# Patient Record
Sex: FEMALE | Race: BLACK OR AFRICAN AMERICAN | Employment: FULL TIME | URBAN - METROPOLITAN AREA
[De-identification: names, ages, dates, MRNs, and addresses within clinical notes are randomized per-mention and may not be internally consistent; named-entity substitution may affect disease eponyms.]

---

## 2018-11-07 LAB
CREATININE, EXTERNAL: 0.71
HBA1C MFR BLD HPLC: 5.8 %
LDL-C, EXTERNAL: 125

## 2020-03-03 ENCOUNTER — OFFICE VISIT (OUTPATIENT)
Dept: INTERNAL MEDICINE CLINIC | Age: 60
End: 2020-03-03

## 2020-03-03 VITALS
SYSTOLIC BLOOD PRESSURE: 112 MMHG | HEIGHT: 66 IN | BODY MASS INDEX: 28.48 KG/M2 | HEART RATE: 87 BPM | RESPIRATION RATE: 16 BRPM | DIASTOLIC BLOOD PRESSURE: 77 MMHG | OXYGEN SATURATION: 96 % | WEIGHT: 177.2 LBS

## 2020-03-03 DIAGNOSIS — M62.838 TRAPEZIUS MUSCLE SPASM: ICD-10-CM

## 2020-03-03 DIAGNOSIS — M75.42 IMPINGEMENT SYNDROME OF LEFT SHOULDER: Primary | ICD-10-CM

## 2020-03-03 DIAGNOSIS — M25.512 ACUTE PAIN OF LEFT SHOULDER: ICD-10-CM

## 2020-03-03 RX ORDER — MELOXICAM 15 MG/1
15 TABLET ORAL DAILY
Qty: 30 TAB | Refills: 0 | Status: SHIPPED | OUTPATIENT
Start: 2020-03-03 | End: 2020-04-06 | Stop reason: SDUPTHER

## 2020-03-03 RX ORDER — IBUPROFEN 200 MG
TABLET ORAL
COMMUNITY

## 2020-03-03 NOTE — PROGRESS NOTES
Chief Complaint   Patient presents with    Shoulder Pain     she is a 64y.o. year old female who presents for evaluation of left shoulder  Pain Assessment Encounter      Dara Phillip  3/3/2020  Onset of Symptoms: years  ________________________________________________________________________  Description: has had problems with bursitis and has had spasms in the neck. Pain now is a dull aching pain and spasming. ROM is limited. Frequency: more than 5 times a day  Pain Scale:(1-10): 5  Trauma Hx: none  Hx of similar symptoms: Yes  Radiation: yes down the arm   Duration:  consistent       Progression: has worsened  What makes it better?: OTC meds and rest  What makes it worse?:certain movements  Medications tried: acetaminophen, ibuprofen    Reviewed and agree with Nurse Note and duplicated in this note. Reviewed PmHx, RxHx, FmHx, SocHx, AllgHx and updated and dated in the chart. No family history on file. No past medical history on file. Social History     Patient does not qualify to have social determinant information on file (likely too young). Socioeconomic History    Marital status:      Spouse name: Not on file    Number of children: Not on file    Years of education: Not on file    Highest education level: Not on file        Review of Systems - negative except as listed above      Objective:     Vitals:    03/03/20 1009   BP: 112/77   Pulse: 87   Resp: 16   SpO2: 96%   Weight: 177 lb 3.2 oz (80.4 kg)   Height: 5' 6\" (1.676 m)       Physical Examination: General appearance - alert, well appearing, and in no distress  Back exam - full range of motion, no tenderness, palpable spasm or pain on motion  Neurological - alert, oriented, normal speech, no focal findings or movement disorder noted  Musculoskeletal - The left shoulder is  normal to inspection. The patient has diminished range with pain  . The shoulderis tender to palpation left upper trap.   Bicep tendon: non-tender  The NEER test is positive. The Dobbs test:is positive   The Cross over test:is  negative. The Empty Can test:is  positive. Stressed ext rotation is:  negative. Stressed int rotation: negative. The Apprehension Sign is negative. The Lift off test is:  negative   Examination reveals the Painful Arch:  positive. The Labral Test is:  not tested. The Sulcus Sign is:  not tested. Extremities - peripheral pulses normal, no pedal edema, no clubbing or cyanosis  Skin - normal coloration and turgor, no rashes, no suspicious skin lesions noted    Assessment/ Plan:   Diagnoses and all orders for this visit:    1. Impingement syndrome of left shoulder  -     REFERRAL TO PHYSICAL THERAPY    2. Acute pain of left shoulder  -     XR SHOULDER LT AP/LAT MIN 2 V; Future    3. Trapezius muscle spasm  -     REFERRAL TO PHYSICAL THERAPY         Pathophysiology, recovery and rehabilitation process discussed and questions answered   Counseling for 30 Minutes of the total visit duration   Pictures and figures used as necessary   Provided reassurance   Recommend activity modification   Recommend  lower impact activities-walking, Eliptical, Nordic Track, cycling or swimming   Follow up in 4 week(s)         1) Remember to stay active and/or exercise regularly (I suggest 30-45 minutes daily)   2) For reliable dietary information, go to www. EATRIGHT.org. You may wish to consider seeing the nutritionist at [facility] [phone], also consider the [address] I have discussed the diagnosis with the patient and the intended plan as seen in the above orders. The patient has received an after-visit summary and questions were answered concerning future plans.      Medication Side Effects and Warnings were discussed with patient,  Patient Labs were reviewed and or requested, and  Patient Past Records were reviewed and or requested  yes      Pt agrees to call or return to clinic and/or go to closest ER with any worsening of symptoms. This may include, but not limited to increased fever (>100.4) with NSAIDS or Tylenol, increased edema, confusion, rash, worsening of presenting symptoms.

## 2020-04-06 RX ORDER — MELOXICAM 15 MG/1
15 TABLET ORAL DAILY
Qty: 30 TAB | Refills: 0 | Status: SHIPPED | OUTPATIENT
Start: 2020-04-06 | End: 2022-06-02 | Stop reason: ALTCHOICE

## 2020-11-10 VITALS
HEART RATE: 73 BPM | OXYGEN SATURATION: 97 % | WEIGHT: 166 LBS | HEIGHT: 66 IN | TEMPERATURE: 97.7 F | BODY MASS INDEX: 26.68 KG/M2 | SYSTOLIC BLOOD PRESSURE: 118 MMHG | DIASTOLIC BLOOD PRESSURE: 72 MMHG

## 2020-11-10 PROBLEM — K21.9 GERD (GASTROESOPHAGEAL REFLUX DISEASE): Status: ACTIVE | Noted: 2020-11-10

## 2020-11-10 PROBLEM — K57.90 DIVERTICULAR DISEASE: Status: ACTIVE | Noted: 2020-11-10

## 2020-11-10 PROBLEM — R73.01 IMPAIRED FASTING BLOOD SUGAR: Status: ACTIVE | Noted: 2020-11-10

## 2020-11-10 PROBLEM — E66.3 OVERWEIGHT: Status: ACTIVE | Noted: 2020-11-10

## 2020-11-10 PROBLEM — K58.9 IRRITABLE BOWEL SYNDROME: Status: ACTIVE | Noted: 2020-11-10

## 2020-11-10 PROBLEM — M17.12 PRIMARY LOCALIZED OSTEOARTHRITIS OF LEFT KNEE: Status: ACTIVE | Noted: 2018-06-04

## 2020-11-16 RX ORDER — OMEGA-3-ACID ETHYL ESTERS 1 G/1
1 CAPSULE, LIQUID FILLED ORAL DAILY
COMMUNITY
End: 2020-12-14

## 2020-11-16 RX ORDER — SAME BUTANEDISULFONATE/BETAINE 400-600 MG
250 POWDER IN PACKET (EA) ORAL DAILY
COMMUNITY
End: 2020-11-19 | Stop reason: ALTCHOICE

## 2020-11-16 RX ORDER — UBIDECARENONE 30 MG
30 CAPSULE ORAL DAILY
COMMUNITY
End: 2022-07-14

## 2020-11-16 RX ORDER — MELOXICAM 15 MG/1
15 TABLET ORAL DAILY
COMMUNITY
Start: 2018-12-26 | End: 2020-11-19 | Stop reason: SDUPTHER

## 2020-11-19 ENCOUNTER — OFFICE VISIT (OUTPATIENT)
Dept: FAMILY MEDICINE CLINIC | Age: 60
End: 2020-11-19
Payer: COMMERCIAL

## 2020-11-19 VITALS
DIASTOLIC BLOOD PRESSURE: 72 MMHG | TEMPERATURE: 96.8 F | WEIGHT: 175 LBS | OXYGEN SATURATION: 98 % | BODY MASS INDEX: 28.12 KG/M2 | HEIGHT: 66 IN | RESPIRATION RATE: 12 BRPM | SYSTOLIC BLOOD PRESSURE: 120 MMHG | HEART RATE: 69 BPM

## 2020-11-19 DIAGNOSIS — R73.01 IMPAIRED FASTING BLOOD SUGAR: ICD-10-CM

## 2020-11-19 DIAGNOSIS — M19.90 ARTHRITIS: ICD-10-CM

## 2020-11-19 DIAGNOSIS — Z11.59 ENCOUNTER FOR HEPATITIS C SCREENING TEST FOR LOW RISK PATIENT: Primary | ICD-10-CM

## 2020-11-19 DIAGNOSIS — Z00.00 ENCOUNTER FOR WELLNESS EXAMINATION IN ADULT: ICD-10-CM

## 2020-11-19 DIAGNOSIS — Z23 IMMUNIZATION DUE: ICD-10-CM

## 2020-11-19 PROCEDURE — 99396 PREV VISIT EST AGE 40-64: CPT | Performed by: NURSE PRACTITIONER

## 2020-11-19 PROCEDURE — 90715 TDAP VACCINE 7 YRS/> IM: CPT | Performed by: NURSE PRACTITIONER

## 2020-11-19 PROCEDURE — 90471 IMMUNIZATION ADMIN: CPT | Performed by: NURSE PRACTITIONER

## 2020-11-19 NOTE — PROGRESS NOTES
Subjective  Chief Complaint   Patient presents with    Physical     fasting     HPI:  Godwin Moreno is a 64 y.o. female. Bea Ganser is a 63 yo female who presents for her annual Wellness and physical with fasting labs. Her last colonoscopy was in 2014 and was to be repeated in 5 years and patient states that she has gotten a reminder from the GI to schedule this appointment and will do so. She is scheduled for a pap this afternoon at 1:30 pm.  I am sending an order to her pharmacy for administration of the shingrix vaccine and she got her flu vaccine last Monday at her local pharmacy. She has an eye appointment scheduled for next week. She has a diagnosis of IBS and is followed by GI for this (Dr. Thaddeus Gomez).   She is otherwise without complaints    Past Medical History:   Diagnosis Date    Arthritis     Diverticular disease 11/10/2020    GERD (gastroesophageal reflux disease) 11/10/2020    Impaired fasting blood sugar 11/10/2020    Irritable bowel syndrome 11/10/2020    Overweight 11/10/2020     Family History   Problem Relation Age of Onset    Diabetes Mother     Hypertension Mother     Diabetes Father     Hypertension Sister     Cancer Maternal Grandmother     Diabetes Paternal Grandfather      Social History     Socioeconomic History    Marital status:      Spouse name: Not on file    Number of children: Not on file    Years of education: Not on file    Highest education level: Not on file   Occupational History    Not on file   Social Needs    Financial resource strain: Not on file    Food insecurity     Worry: Not on file     Inability: Not on file    Transportation needs     Medical: Not on file     Non-medical: Not on file   Tobacco Use    Smoking status: Never Smoker    Smokeless tobacco: Never Used   Substance and Sexual Activity    Alcohol use: Never     Frequency: Never    Drug use: Never    Sexual activity: Yes   Lifestyle    Physical activity     Days per week: Not on file     Minutes per session: Not on file    Stress: Not on file   Relationships    Social connections     Talks on phone: Not on file     Gets together: Not on file     Attends [employer] service: Not on file     Active member of club or organization: Not on file     Attends meetings of clubs or organizations: Not on file     Relationship status: Not on file    Intimate partner violence     Fear of current or ex partner: Not on file     Emotionally abused: Not on file     Physically abused: Not on file     Forced sexual activity: Not on file   Other Topics Concern    Not on file   Social History Narrative    Not on file     Current Outpatient Medications on File Prior to Visit   Medication Sig Dispense Refill    omega-3 acid ethyl esters (LOVAZA) 1 gram capsule Take 1 Cap by mouth daily.  coenzyme Q-10 (CO Q-10) 30 mg capsule Take 30 mg by mouth daily.  ibuprofen (MOTRIN) 200 mg tablet Take  by mouth.  meloxicam (MOBIC) 15 mg tablet Take 1 Tab by mouth daily. 30 Tab 0     No current facility-administered medications on file prior to visit. Allergies   Allergen Reactions    Latex Rash     Latex gloves     ROS   ROS per PMH and HPI    Objective  Physical Exam  Vitals signs reviewed. HENT:      Head: Normocephalic. Neck:      Musculoskeletal: Normal range of motion. Cardiovascular:      Rate and Rhythm: Normal rate. Pulmonary:      Effort: Pulmonary effort is normal.      Breath sounds: Normal breath sounds. Abdominal:      Palpations: Abdomen is soft. Musculoskeletal: Normal range of motion. Skin:     General: Skin is warm and dry. Neurological:      Mental Status: She is alert and oriented to person, place, and time. Psychiatric:         Mood and Affect: Mood normal.         Behavior: Behavior normal.         Thought Content: Thought content normal.         Judgment: Judgment normal.          Assessment & Plan      ICD-10-CM ICD-9-CM    1.  Encounter for hepatitis C screening test for low risk patient  Z11.59 V73.89 HEPATITIS C AB   2. Impaired fasting blood sugar  R73.01 790.21 HEMOGLOBIN A1C WITH EAG      LIPID PANEL      CBC WITH AUTOMATED DIFF      MICROALBUMIN, UR, RAND W/ MICROALB/CREAT RATIO   3. Immunization due  Z23 V05.9 TETANUS, DIPHTHERIA TOXOIDS AND ACELLULAR PERTUSSIS VACCINE (TDAP), IN INDIVIDS. >=7, IM   4. Encounter for wellness examination in adult  Z00.00 V70.0    5. Arthritis  M19.90 716.90      Diagnoses and all orders for this visit:    1. Encounter for hepatitis C screening test for low risk patient  -     HEPATITIS C AB    2. Impaired fasting blood sugar  -     HEMOGLOBIN A1C WITH EAG  -     LIPID PANEL  -     CBC WITH AUTOMATED DIFF  -     MICROALBUMIN, UR, RAND W/ MICROALB/CREAT RATIO    3. Immunization due  -     TETANUS, DIPHTHERIA TOXOIDS AND ACELLULAR PERTUSSIS VACCINE (TDAP), IN INDIVIDS. >=7, IM    4. Encounter for wellness examination in adult    5. Arthritis      Follow-up and Dispositions    · Return in about 1 year (around 11/19/2021) for Wellness with fasting labs.        Leif Torrez, ARCENIO

## 2020-11-20 LAB
ALBUMIN/CREAT UR: 4 MG/G CREAT (ref 0–29)
BASOPHILS # BLD AUTO: 0.1 X10E3/UL (ref 0–0.2)
BASOPHILS NFR BLD AUTO: 1 %
CHOLEST SERPL-MCNC: 244 MG/DL (ref 100–199)
CREAT UR-MCNC: 126.4 MG/DL
EOSINOPHIL # BLD AUTO: 0.2 X10E3/UL (ref 0–0.4)
EOSINOPHIL NFR BLD AUTO: 5 %
ERYTHROCYTE [DISTWIDTH] IN BLOOD BY AUTOMATED COUNT: 13.4 % (ref 11.7–15.4)
EST. AVERAGE GLUCOSE BLD GHB EST-MCNC: 123 MG/DL
HBA1C MFR BLD: 5.9 % (ref 4.8–5.6)
HCT VFR BLD AUTO: 40.4 % (ref 34–46.6)
HCV AB S/CO SERPL IA: <0.1 S/CO RATIO (ref 0–0.9)
HDLC SERPL-MCNC: 68 MG/DL
HGB BLD-MCNC: 13.1 G/DL (ref 11.1–15.9)
IMM GRANULOCYTES # BLD AUTO: 0 X10E3/UL (ref 0–0.1)
IMM GRANULOCYTES NFR BLD AUTO: 0 %
LDLC SERPL CALC-MCNC: 154 MG/DL (ref 0–99)
LYMPHOCYTES # BLD AUTO: 2 X10E3/UL (ref 0.7–3.1)
LYMPHOCYTES NFR BLD AUTO: 45 %
MCH RBC QN AUTO: 27.6 PG (ref 26.6–33)
MCHC RBC AUTO-ENTMCNC: 32.4 G/DL (ref 31.5–35.7)
MCV RBC AUTO: 85 FL (ref 79–97)
MICROALBUMIN UR-MCNC: 5.2 UG/ML
MONOCYTES # BLD AUTO: 0.4 X10E3/UL (ref 0.1–0.9)
MONOCYTES NFR BLD AUTO: 8 %
NEUTROPHILS # BLD AUTO: 1.8 X10E3/UL (ref 1.4–7)
NEUTROPHILS NFR BLD AUTO: 41 %
PLATELET # BLD AUTO: 354 X10E3/UL (ref 150–450)
RBC # BLD AUTO: 4.75 X10E6/UL (ref 3.77–5.28)
TRIGL SERPL-MCNC: 124 MG/DL (ref 0–149)
VLDLC SERPL CALC-MCNC: 22 MG/DL (ref 5–40)
WBC # BLD AUTO: 4.4 X10E3/UL (ref 3.4–10.8)

## 2020-11-29 PROBLEM — M19.90 ARTHRITIS: Status: ACTIVE | Noted: 2020-11-29

## 2020-11-29 PROBLEM — N39.3 STRESS INCONTINENCE: Status: ACTIVE | Noted: 2020-11-29

## 2020-12-11 NOTE — PROGRESS NOTES
Your A1C is 5.9 and it was 5.8 at your last set of labs so I would say that it is stable at this time. Your total cholesterol and bad cholesterol are slightly elevated. Your total cholesterol was 204 and it is now 244 and your bad cholesterol was 125 and it is now 154. I see that your are on Lovaza which is treatment for high triglycerides and your triglycerides are WNL at this time. I would suggest lifestyle changes for the other elements of your lipid profiles such as:  Decrease your intake of red meat and increase your intake of fatty fish such as salmon, mackerel and sardines. Increase fiber in your diet. Decrease your intake of full fat dairy products such as milk and yogurt and try to incorporate at least 30 minutes of exercise most days of the week and losing weight helps everything.   Your other labs are WNL and we will recheck all of your labs at your next office visit

## 2020-12-19 PROBLEM — Z00.00 ENCOUNTER FOR WELLNESS EXAMINATION IN ADULT: Status: RESOLVED | Noted: 2020-11-19 | Resolved: 2020-12-19

## 2022-01-05 ENCOUNTER — TELEPHONE (OUTPATIENT)
Dept: FAMILY MEDICINE CLINIC | Age: 62
End: 2022-01-05

## 2022-01-05 ENCOUNTER — NURSE TRIAGE (OUTPATIENT)
Dept: OTHER | Facility: CLINIC | Age: 62
End: 2022-01-05

## 2022-01-05 NOTE — TELEPHONE ENCOUNTER
Received call from [facility] at [facility] with Red Flag Complaint. Subjective: Caller states \"has a sore soft tender spot in right  hairline about the size of a quarter. Negative covid test yesterday. right gland behind ear is swollen about size of a pea and has some pressure in right ear\"     Current Symptoms: see above    Onset: 4 days ago; sudden    Associated Symptoms: NA    Pain Severity: 4/10; sharp; constant    Temperature: denies fever     What has been tried: NSAID, antifungal cream    LMP: NA Pregnant: NA    Recommended disposition: see in office in next 3 days    Care advice provided, patient verbalizes understanding; denies any other questions or concerns; instructed to call back for any new or worsening symptoms. Writer provided warm transfer to Jo Russell at [facility] for appointment scheduling    Attention Provider: Thank you for allowing me to participate in the care of your patient. The patient was connected to triage in response to information provided to the E[facility]. Please do not respond through this encounter as the response is not directed to a shared pool.       Reason for Disposition   Patient wants to be seen    Protocols used: SKIN LUMP OR LOCALIZED SWELLING-ADULT-OH

## 2022-01-05 NOTE — TELEPHONE ENCOUNTER
----- Message from Linden Chapin sent at 1/5/2022 11:06 AM EST -----  Subject: Appointment Request    Reason for Call: Semi-Urgent Return from RN Triage    QUESTIONS  Type of Appointment? Established Patient  Reason for appointment request? No appointments available during search  Additional Information for Provider? Pt is calling she is a return from [facility]   was recommended to be seen in 3 days no appts are available during my   search. She was speaking with someone but was disconnected and no one   called her back. Please contact pt asap tried to transfer to office only   received a busy signal.   ---------------------------------------------------------------------------  --------------  [facility]  What is the best way for the office to contact you? OK to leave message on   treadalong, OK to respond with electronic message via [employer] portal (only   for patients who have registered [employer] account)  Preferred Call Back Phone Number? [phone]  ---------------------------------------------------------------------------  --------------  SCRIPT ANSWERS  Patient needs to be seen within 3 days? Yes   Nurse Name? Joana Patel  Have you been diagnosed with, awaiting test results for, or told that you   are suspected of having COVID-19 (Coronavirus)? (If patient has tested   negative or was tested as a requirement for work, school, or travel and   not based on symptoms, answer no)? No  Within the past two weeks have you developed any of the following symptoms   (answer no if symptoms have been present longer than 2 weeks or began   more than 2 weeks ago)? Fever or Chills, Cough, Shortness of breath or   difficulty breathing, Loss of taste or smell, Sore throat, Nasal   congestion, Sneezing or runny nose, Fatigue or generalized body aches   (answer no if pain is specific to a body part e.g. back pain), Diarrhea,   Headache? No  Have you had close contact with someone with COVID-19 in the last 14 days?    No  (Service Expert  click yes below to proceed with [employer] As Usual   Scheduling)?  Yes

## 2022-03-18 PROBLEM — R73.01 IMPAIRED FASTING BLOOD SUGAR: Status: ACTIVE | Noted: 2020-11-10

## 2022-03-18 PROBLEM — M19.90 ARTHRITIS: Status: ACTIVE | Noted: 2020-11-29

## 2022-03-18 PROBLEM — K21.9 GERD (GASTROESOPHAGEAL REFLUX DISEASE): Status: ACTIVE | Noted: 2020-11-10

## 2022-03-19 PROBLEM — M17.12 PRIMARY LOCALIZED OSTEOARTHRITIS OF LEFT KNEE: Status: ACTIVE | Noted: 2018-06-04

## 2022-03-19 PROBLEM — K57.90 DIVERTICULAR DISEASE: Status: ACTIVE | Noted: 2020-11-10

## 2022-03-19 PROBLEM — N39.3 STRESS INCONTINENCE: Status: ACTIVE | Noted: 2020-11-29

## 2022-03-19 PROBLEM — K58.9 IRRITABLE BOWEL SYNDROME: Status: ACTIVE | Noted: 2020-11-10

## 2022-03-20 PROBLEM — E66.3 OVERWEIGHT: Status: ACTIVE | Noted: 2020-11-10

## 2022-06-02 ENCOUNTER — TELEPHONE (OUTPATIENT)
Dept: NEUROLOGY | Age: 62
End: 2022-06-02

## 2022-06-02 ENCOUNTER — OFFICE VISIT (OUTPATIENT)
Dept: INTERNAL MEDICINE CLINIC | Age: 62
End: 2022-06-02
Payer: COMMERCIAL

## 2022-06-02 VITALS
RESPIRATION RATE: 16 BRPM | HEIGHT: 66 IN | WEIGHT: 174 LBS | HEART RATE: 69 BPM | BODY MASS INDEX: 27.97 KG/M2 | DIASTOLIC BLOOD PRESSURE: 83 MMHG | SYSTOLIC BLOOD PRESSURE: 124 MMHG | OXYGEN SATURATION: 95 % | TEMPERATURE: 97.8 F

## 2022-06-02 DIAGNOSIS — R73.01 IMPAIRED FASTING BLOOD SUGAR: ICD-10-CM

## 2022-06-02 DIAGNOSIS — R41.3 MEMORY LOSS: ICD-10-CM

## 2022-06-02 DIAGNOSIS — Z00.00 WELL WOMAN EXAM (NO GYNECOLOGICAL EXAM): Primary | ICD-10-CM

## 2022-06-02 PROCEDURE — 99396 PREV VISIT EST AGE 40-64: CPT | Performed by: FAMILY MEDICINE

## 2022-06-02 PROCEDURE — 99214 OFFICE O/P EST MOD 30 MIN: CPT | Performed by: FAMILY MEDICINE

## 2022-06-02 RX ORDER — ACETAMINOPHEN 500 MG
TABLET ORAL
COMMUNITY

## 2022-06-02 RX ORDER — OMEPRAZOLE 10 MG/1
CAPSULE, DELAYED RELEASE ORAL
COMMUNITY

## 2022-06-02 NOTE — PROGRESS NOTES
Chief Complaint   Patient presents with    Complete Physical     Patient is here for a wellness visit. she is a 63y.o. year old female who presents for CPE  Complete Physical Exam Questions:    LMP -  hysterectomy   Last Pap (q 3 years, or q5 with HPV) - 2/2022  Last Mammogram (50-74 biennially)- 2021  Hx of abnl Pap - Yes    1. Do you follow a low fat diet?  no  2. Are you up to date on your Tdap (<10 years)? Yes  3. Have you ever had a Pneumovax vaccine (>65)? No   PCV13 No   PPSV23 No  4. Have you had Zoster vaccine (>60)? No  5. Have you had the HPV - Gardasil (13- 26)? Not applicable  6. Do you follow an exercise program?  yes  7. Do you smoke?  no  8. Do you consider yourself overweight?  no  9. Is there a family history of CAD< age 48? No  10. Is there a family history of Cancer?  yes  11. Do you know your Cancer risks? Yes  12. Have you had a colonoscopy?  no  13. Have you been tested for HIV or other STI's? No HIV testing today(18-66 y/o)? No  14. Have had a bone density scan or DEXA done(>65)? Yes  15. Have you had an EKG performed in the last five years (>50)? No    Other complaints: memory loss    Reviewed and agree with Nurse Note and duplicated in this note. Reviewed PmHx, RxHx, FmHx, SocHx, AllgHx and updated and dated in the chart.     Family History   Problem Relation Age of Onset    Diabetes Mother     Hypertension Mother     Diabetes Father     Hypertension Sister     Cancer Maternal Grandmother     Diabetes Paternal Grandfather        Past Medical History:   Diagnosis Date    Arthritis     Diverticular disease 11/10/2020    GERD (gastroesophageal reflux disease) 11/10/2020    Impaired fasting blood sugar 11/10/2020    Irritable bowel syndrome 11/10/2020    Overweight 11/10/2020      Social History     Socioeconomic History    Marital status:    Tobacco Use    Smoking status: Never Smoker    Smokeless tobacco: Never Used   Substance and Sexual Activity  Alcohol use: Never    Drug use: Never    Sexual activity: Yes     Social Determinants of Health     Physical Activity: Insufficiently Active    Days of Exercise per Week: 1 day    Minutes of Exercise per Session: 30 min        Review of Systems - negative except as listed above      Objective:     Vitals:    06/02/22 1130   BP: 124/83   Pulse: 69   Resp: 16   Temp: 97.8 °F (36.6 °C)   SpO2: 95%   Weight: 174 lb (78.9 kg)   Height: 5' 6\" (1.676 m)       Physical Examination: General appearance - alert, well appearing, and in no distress  Eyes - pupils equal and reactive, extraocular eye movements intact  Ears - bilateral TM's and external ear canals normal  Nose - normal and patent, no erythema, discharge or polyps  Mouth - mucous membranes moist, pharynx normal without lesions  Neck - supple, no significant adenopathy  Chest - clear to auscultation, no wheezes, rales or rhonchi, symmetric air entry  Heart - normal rate, regular rhythm, normal S1, S2, no murmurs, rubs, clicks or gallops  Abdomen - soft, nontender, nondistended, no masses or organomegaly  Neurological - alert, oriented, normal speech, no focal findings or movement disorder noted  Musculoskeletal - no joint tenderness, deformity or swelling  Extremities - peripheral pulses normal, no pedal edema, no clubbing or cyanosis  Skin - normal coloration and turgor, no rashes, no suspicious skin lesions noted      Assessment/ Plan:   Diagnoses and all orders for this visit:    1. Well woman exam (no gynecological exam)  -     CBC W/O DIFF; Future  -     LIPID PANEL; Future  -     METABOLIC PANEL, COMPREHENSIVE; Future    2. Impaired fasting blood sugar  -     HEMOGLOBIN A1C WITH EAG; Future    3. Memory loss  -     TSH 3RD GENERATION; Future  -     VITAMIN B12 & FOLATE;  Future  -     REFERRAL TO NEUROLOGY           Labs to be drawn: CBC, CMP, Lipid            I have discussed the diagnosis with the patient and the intended plan as seen in the above orders. The patient has received an after-visit summary and questions were answered concerning future plans. Medication Side Effects and Warnings were discussed with patient,  Patient Labs were reviewed and or requested, and  Patient Past Records were reviewed and or requested  yes       Chief Complaint   Patient presents with    Complete Physical     Patient is here for a wellness visit. she is a 63y.o. year old female who presents for evaluation of memory loss over the last 1 month. Patient states that she will constantly be admitted forget what her train of thought was. Denies any head trauma or increased stress or anxiety. Patient states that she never had the symptoms before. She does have a history of Alzheimer's in the family and her father, grandmother and great-grandmother    Reviewed and agree with Nurse Note and duplicated in this note. Reviewed PmHx, RxHx, FmHx, SocHx, AllgHx and updated and dated in the chart.     Family History   Problem Relation Age of Onset    Diabetes Mother     Hypertension Mother     Diabetes Father     Hypertension Sister     Cancer Maternal Grandmother     Diabetes Paternal Grandfather        Past Medical History:   Diagnosis Date    Arthritis     Diverticular disease 11/10/2020    GERD (gastroesophageal reflux disease) 11/10/2020    Impaired fasting blood sugar 11/10/2020    Irritable bowel syndrome 11/10/2020    Overweight 11/10/2020      Social History     Socioeconomic History    Marital status:    Tobacco Use    Smoking status: Never Smoker    Smokeless tobacco: Never Used   Substance and Sexual Activity    Alcohol use: Never    Drug use: Never    Sexual activity: Yes     Social Determinants of Health     Physical Activity: Insufficiently Active    Days of Exercise per Week: 1 day    Minutes of Exercise per Session: 30 min        Review of Systems - negative except as listed above      Objective:     Vitals:    06/02/22 1130   BP: 124/83   Pulse: 69   Resp: 16   Temp: 97.8 °F (36.6 °C)   SpO2: 95%   Weight: 174 lb (78.9 kg)   Height: 5' 6\" (1.676 m)       Physical Examination: General appearance - alert, well appearing, and in no distress  Eyes - pupils equal and reactive, extraocular eye movements intact  Ears - bilateral TM's and external ear canals normal  Nose - normal and patent, no erythema, discharge or polyps  Mouth - mucous membranes moist, pharynx normal without lesions  Neck - supple, no significant adenopathy  Chest - clear to auscultation, no wheezes, rales or rhonchi, symmetric air entry  Heart - normal rate, regular rhythm, normal S1, S2, no murmurs, rubs, clicks or gallops  Abdomen - soft, nontender, nondistended, no masses or organomegaly  Musculoskeletal - no joint tenderness, deformity or swelling  Extremities - peripheral pulses normal, no pedal edema, no clubbing or cyanosis  Skin - normal coloration and turgor, no rashes, no suspicious skin lesions noted     Assessment/ Plan:   Diagnoses and all orders for this visit:    1. Well woman exam (no gynecological exam)  -     CBC W/O DIFF; Future  -     LIPID PANEL; Future  -     METABOLIC PANEL, COMPREHENSIVE; Future    2. Impaired fasting blood sugar  -     HEMOGLOBIN A1C WITH EAG; Future    3. Memory loss  -     TSH 3RD GENERATION; Future  -     VITAMIN B12 & FOLATE; Future  -     REFERRAL TO NEUROLOGY                I have discussed the diagnosis with the patient and the intended plan as seen in the above orders. The patient has received an after-visit summary and questions were answered concerning future plans. Medication Side Effects and Warnings were discussed with patient,  Patient Labs were reviewed and or requested, and  Patient Past Records were reviewed and or requested  yes       Pt agrees to call or return to clinic and/or go to closest ER with any worsening of symptoms.   This may include, but not limited to increased fever (>100.4) with NSAIDS or Tylenol, increased edema, confusion, rash, worsening of presenting symptoms. Please note that this dictation was completed with [employer], the computer voice recognition software. Quite often unanticipated grammatical, syntax, homophones, and other interpretive errors are inadvertently transcribed by the computer software. Please disregard these errors. Please excuse any errors that have escaped final proofreading. Thank you.

## 2022-06-03 LAB
ALBUMIN SERPL-MCNC: 3.6 G/DL (ref 3.5–5)
ALBUMIN/GLOB SERPL: 1 {RATIO} (ref 1.1–2.2)
ALP SERPL-CCNC: 81 U/L (ref 45–117)
ALT SERPL-CCNC: 21 U/L (ref 12–78)
ANION GAP SERPL CALC-SCNC: 3 MMOL/L (ref 5–15)
AST SERPL-CCNC: 16 U/L (ref 15–37)
BILIRUB SERPL-MCNC: 0.5 MG/DL (ref 0.2–1)
BUN SERPL-MCNC: 12 MG/DL (ref 6–20)
BUN/CREAT SERPL: 16 (ref 12–20)
CALCIUM SERPL-MCNC: 9.4 MG/DL (ref 8.5–10.1)
CHLORIDE SERPL-SCNC: 105 MMOL/L (ref 97–108)
CHOLEST SERPL-MCNC: 206 MG/DL
CO2 SERPL-SCNC: 31 MMOL/L (ref 21–32)
CREAT SERPL-MCNC: 0.73 MG/DL (ref 0.55–1.02)
ERYTHROCYTE [DISTWIDTH] IN BLOOD BY AUTOMATED COUNT: 15.5 % (ref 11.5–14.5)
EST. AVERAGE GLUCOSE BLD GHB EST-MCNC: 123 MG/DL
FOLATE SERPL-MCNC: 16.9 NG/ML (ref 5–21)
GLOBULIN SER CALC-MCNC: 3.7 G/DL (ref 2–4)
GLUCOSE SERPL-MCNC: 82 MG/DL (ref 65–100)
HBA1C MFR BLD: 5.9 % (ref 4–5.6)
HCT VFR BLD AUTO: 41 % (ref 35–47)
HDLC SERPL-MCNC: 61 MG/DL
HDLC SERPL: 3.4 {RATIO} (ref 0–5)
HGB BLD-MCNC: 12.4 G/DL (ref 11.5–16)
LDLC SERPL CALC-MCNC: 123.2 MG/DL (ref 0–100)
MCH RBC QN AUTO: 27.8 PG (ref 26–34)
MCHC RBC AUTO-ENTMCNC: 30.2 G/DL (ref 30–36.5)
MCV RBC AUTO: 91.9 FL (ref 80–99)
NRBC # BLD: 0 K/UL (ref 0–0.01)
NRBC BLD-RTO: 0 PER 100 WBC
PLATELET # BLD AUTO: 278 K/UL (ref 150–400)
PMV BLD AUTO: 9.7 FL (ref 8.9–12.9)
POTASSIUM SERPL-SCNC: 4.2 MMOL/L (ref 3.5–5.1)
PROT SERPL-MCNC: 7.3 G/DL (ref 6.4–8.2)
RBC # BLD AUTO: 4.46 M/UL (ref 3.8–5.2)
SODIUM SERPL-SCNC: 139 MMOL/L (ref 136–145)
TRIGL SERPL-MCNC: 109 MG/DL (ref ?–150)
TSH SERPL DL<=0.05 MIU/L-ACNC: 0.91 UIU/ML (ref 0.36–3.74)
VIT B12 SERPL-MCNC: 406 PG/ML (ref 193–986)
VLDLC SERPL CALC-MCNC: 21.8 MG/DL
WBC # BLD AUTO: 3.8 K/UL (ref 3.6–11)

## 2022-06-03 NOTE — PROGRESS NOTES
Blood sugars are in prediabetes territory but holding steady from 1 year ago.   We will continue check in 6 monthsCholesterols come down since last year, continue good work with diet and exercise changes and will repeat in 1 year

## 2022-07-14 ENCOUNTER — OFFICE VISIT (OUTPATIENT)
Dept: NEUROLOGY | Age: 62
End: 2022-07-14
Payer: COMMERCIAL

## 2022-07-14 VITALS
DIASTOLIC BLOOD PRESSURE: 80 MMHG | OXYGEN SATURATION: 98 % | BODY MASS INDEX: 27 KG/M2 | SYSTOLIC BLOOD PRESSURE: 138 MMHG | HEART RATE: 83 BPM | WEIGHT: 168 LBS | HEIGHT: 66 IN

## 2022-07-14 DIAGNOSIS — Z87.898 HISTORY OF SNORING: ICD-10-CM

## 2022-07-14 DIAGNOSIS — R41.89 SUBJECTIVE MEMORY COMPLAINTS: Primary | ICD-10-CM

## 2022-07-14 PROCEDURE — 99204 OFFICE O/P NEW MOD 45 MIN: CPT | Performed by: PSYCHIATRY & NEUROLOGY

## 2022-07-14 NOTE — PROGRESS NOTES
NEUROLOGY  NEW PATIENT EVALUATION/CONSULTATION       PATIENT NAME: Kaaren Hodgkin    MRN: [id number]    REASON FOR CONSULTATION: Memory impairment    07/14/22      Previous records (physician notes, laboratory reports, and radiology reports) and imaging studies were reviewed and summarized. My recommendations will be communicated back to the patient's physician(s) via electronic medical record and/or by [address] [facility] [facility] mail. HISTORY OF PRESENT ILLNESS:  Kaaren Hodgkin is a 63 y.o.  female presenting for evaluation of memory impairment. Onset and progression: 3 months, progressive    Neuropsychiatric symptoms    By Family members account:    Problems with judgment:No   Reduced interest in hobbies/activities: No   Repeats questions, stories, or statements: No    Trouble recalling people's names: No   Trouble learning how to use a tool or appliance: No   Forgetting the correct month or year: No   Difficulty handling financial affairs (bill-paying, taxes): No   Difficulty remembering appointments:Yes    Memory: Short term recall deficits, forgets what she needs to say during conversations  Language: Denies word finding difficulty  Change in personality: None  Socially inappropriate behavior: None  Change in eating habits: None  Physical changes: Not sleeping well in the evenings, +snoring, otherwise denies tremors, focal weakness/numbness, falls, gait instability  Depressive symptoms: None  Hallucinations/Delusions: None    Ability to function:  Driving: Yes, no difficulty  Finances: Handles independently without issues  Manages own medication: Yes, no issues  Residing:  At home with family    Prior work-up:B12, TSH normal    Prior treatments: None      PAST MEDICAL HISTORY:  Past Medical History:   Diagnosis Date    Arthritis     Diverticular disease 11/10/2020    GERD (gastroesophageal reflux disease) 11/10/2020    Impaired fasting blood sugar 11/10/2020    Irritable bowel syndrome 11/10/2020    Osteoarthritis (arthritis due to wear and tear of joints)     knees    Overweight 11/10/2020       PAST SURGICAL HISTORY:  Past Surgical History:   Procedure Laterality Date    HX GI      HX GYN      HX HERNIA REPAIR         FAMILY HISTORY:   Family History   Problem Relation Age of Onset    Diabetes Mother     Hypertension Mother     Diabetes Father     Hypertension Sister     Cancer Maternal Grandmother     Diabetes Paternal Grandfather    Father/GF-dementia      SOCIAL HISTORY:  Social History     Socioeconomic History    Marital status:    Tobacco Use    Smoking status: Never Smoker    Smokeless tobacco: Never Used   Substance and Sexual Activity    Sexual activity: Yes     Social Determinants of Health     Physical Activity: Insufficiently Active    Days of Exercise per Week: 1 day    Minutes of Exercise per Session: 30 min         MEDICATIONS:   Current Outpatient Medications   Medication Sig Dispense Refill    multivitamin, tx-iron-ca-min (THERA-M w/ IRON) 9 mg iron-400 mcg tab tablet Take 1 Tablet by mouth daily.  omeprazole (PRILOSEC) 10 mg capsule       cholecalciferol (VITAMIN D3) (2,000 UNITS /50 MCG) cap capsule       ibuprofen (MOTRIN) 200 mg tablet Take  by mouth. ALLERGIES:  Allergies   Allergen Reactions    Latex Rash     Latex gloves         REVIEW OF SYSTEMS:  10 point ROS reviewed with patient. Please see scanned document under media. PHYSICAL EXAM:  Vital Signs:   Visit Vitals  /80   Pulse 83   Ht 5' 6\" (1.676 m)   Wt 168 lb (76.2 kg)   SpO2 98%   BMI 27.12 kg/m²        General Medical Exam:  General:  Well appearing, comfortable, in no apparent distress. Eyes/ENT: see cranial nerve examination. Neck: No masses appreciated. Full range of motion without tenderness. Respiratory:  Clear to auscultation, good air entry bilaterally. Cardiac:  Regular rate and rhythm, no murmur. GI:  Soft, non-tender, non-distended abdomen. Bowel sounds normal. No masses, organomegaly. Extremities:  No deformities, edema, or skin discoloration. Skin:  No rashes or lesions. Neurological:  · Mental Status:  Alert and oriented to person, place, and time with fluent speech. · MOCA: 29/30 (see scanned media)  · Cranial Nerves:   CNII/III/IV/VI: visual fields full to confrontation, EOMI, PERRL, no ptosis or nystagmus. CN V: Facial sensation intact bilaterally, masseter 5/5   CN VII: Facial muscles symmetric and strong   CN VIII: Hears finger rub well bilaterally, intact vestibular function   CN IX/X: Normal palatal movement   CN XI: Full strength shoulder shrug bilaterally   CN XII: Tongue protrusion full and midline without fasciculation or atrophy  · Motor: Normal tone and muscle bulk with no pronator drift. No atrophy or fasciculations present on examination. Individual muscle group testing:  Shoulder abduction:   Left:5/5   Right : 5/5    Shoulder adduction:   Left:5/5   Right : 5/5    Elbow flexion:      Left:5/5   Right : 5/5  Elbow extension:    Left:5/5   Right : 5/5   Wrist flexion:    Left:5/5   Right : 5/5  Wrist extension:    Left:5/5   Right : 5/5  Arm pronation:   Left:5/5   Right : 5/5  Arm supination:   Left:5/5   Right : 5/5    Finger flexion:    Left:5/5   Right : 5/5    Finger extension:   Left:5/5   Right : 5/5   Finger abduction:  Left:5/5   Right : 5/5   Finger adduction:   Left:5/5   Right : 5/5  Hip flexion:     Left:5/5   Right : 5/5         Hip extension:   Left:5/5   Right : 5/5    Knee flexion:    Left:5/5   Right : 5/5    Knee extension:   Left:5/5   Right : 5/5    Dorsiflexion:     Left:5/5   Right : 5/5  Plantar flexion:    Left:5/5   Right : 5/5      · MSRs: No crossed adductors or clonus.          RIGHT  LEFT   Brachioradialis 2+ 2+   Biceps 2+ 2+   Triceps 2+ 2+   Knee 2+ 2+   Achilles 2+ 2+        Plantar response Downward Downward          · Sensation: Normal and symmetric perception of pinprick, temperature, light touch, proprioception, and vibration;  · Coordination: No dysmetria. Normal rapid alternating movements; finger-to-nose and heel-to- shin testing are within normal limits. · Gait: Normal native gait    Component      Latest Ref Rng & Units 6/2/2022 6/2/2022          12:33 AM 12:33 AM   Vitamin B12      193 - 986 pg/mL  406   Folate      5.0 - 21.0 ng/mL  16.9   TSH      0.36 - 3.74 uIU/mL 0.91        ASSESSMENT:      ICD-10-CM ICD-9-CM    1. Subjective memory complaints  R41.89 780.93    2. History of snoring  Z87.898 V15.89 SLEEP MEDICINE REFERRAL   63year old female referred for evaluation of progressive recall deficits over the past several months. Family history is notable for paternal dementia. MOCA is 29/30 today and within normal range with non-focal neurologic examination. She was reassured that based on  assessment today there does not appear to be an evolving neurodegenerative process contributing to her perceived recall deficits. She does endorse inefficient sleep patterns as well as a h/o snoring. We discussed sleep medicine referral for formal evaluation of potential sleep disorder including obstructive sleep apnea which may contribute to cognitive symptoms. She was advised to return for repeat clinical assessment if any new/worsening neurologic symptoms. PLAN:  Sleep medicine referral for evaluation of potential PERLA  Heart healthy diet and exercise  Regular scheduled cognitive and social engagement. Follow-up and Dispositions    · Return if symptoms worsen or fail to improve. I have discussed the diagnosis with the patient today and the intended plan as seen in the above orders with both the patient as well as referring provider and/or PCP via electronic correspondence. The patient has received an after-visit summary and questions were answered concerning future plans. Denise Harper DO  Staff Neurologist  Diplomate, American Board of Psychiatry & Neurology       CC Referring provider:    Tora Hammans, MD

## 2022-07-18 ENCOUNTER — PATIENT MESSAGE (OUTPATIENT)
Dept: SLEEP MEDICINE | Age: 62
End: 2022-07-18

## 2022-07-26 ENCOUNTER — TELEPHONE (OUTPATIENT)
Dept: SLEEP MEDICINE | Age: 62
End: 2022-07-26

## 2022-07-26 ENCOUNTER — OFFICE VISIT (OUTPATIENT)
Dept: SLEEP MEDICINE | Age: 62
End: 2022-07-26
Payer: COMMERCIAL

## 2022-07-26 VITALS
BODY MASS INDEX: 29.94 KG/M2 | WEIGHT: 179.7 LBS | HEART RATE: 94 BPM | DIASTOLIC BLOOD PRESSURE: 73 MMHG | OXYGEN SATURATION: 98 % | SYSTOLIC BLOOD PRESSURE: 123 MMHG | HEIGHT: 65 IN

## 2022-07-26 DIAGNOSIS — G47.33 OSA (OBSTRUCTIVE SLEEP APNEA): Primary | ICD-10-CM

## 2022-07-26 PROCEDURE — 99204 OFFICE O/P NEW MOD 45 MIN: CPT | Performed by: SPECIALIST

## 2022-07-26 NOTE — PROGRESS NOTES
[address] [facility] [place]. [place], [facility]  Tel.  [phone]  Fax. [address] [facility]  [place], [address] [facility]  Tel.  [phone]  Fax. [phone] [address]  Tel.  [phone]  Fax. [phone]       Chief Complaint       Chief Complaint   Patient presents with    Sleep Problem     F2F NP refd by Stefania Brand DO for snoring       HPI      Latrell Samuels is 63 y.o. female seen for evaluation of a sleep disorder. Notes short-term memory issues, more prominent during the past several months for which she has been seen by neurology. Describes her elf as not sleeping well. Normally retires between 8: 30-11 PM and will awaken at 5: 30 AM spontaneously. May awaken once during the night. Has been told of snoring described as loud at times. Describes herself as tired on awakening and during the day. Notes that she will sleep preferentially supine. The patient reports she has experienced sleep talking and apparent bruxism. Denies sleep walking, nocturnal incontinence, hypnagogue hallucinations, sleep paralysis or cataplexy. The patient has not undergone diagnostic testing for the current problems. [place] Sleepiness Score: (P) 12       Allergies   Allergen Reactions    Latex Rash     Latex gloves       Current Outpatient Medications   Medication Sig Dispense Refill    multivitamin, tx-iron-ca-min (THERA-M w/ IRON) 9 mg iron-400 mcg tab tablet Take 1 Tablet by mouth daily. omeprazole (PRILOSEC) 10 mg capsule       cholecalciferol (VITAMIN D3) (2,000 UNITS /50 MCG) cap capsule       ibuprofen (MOTRIN) 200 mg tablet Take  by mouth.           She  has a past medical history of Arthritis, Diverticular disease (11/10/2020), GERD (gastroesophageal reflux disease) (11/10/2020), Impaired fasting blood sugar (11/10/2020), Irritable bowel syndrome (11/10/2020), Osteoarthritis (arthritis due to wear and tear of joints), and Overweight (11/10/2020). She  has a past surgical history that includes hx gi; hx hernia repair; and hx gyn. She family history includes Cancer in her maternal grandmother; Diabetes in her father, mother, and paternal grandfather; Hypertension in her mother and sister. She  reports that she has never smoked. She has never used smokeless tobacco.     Review of Systems:  Review of Systems   Constitutional:  Negative for weight loss. HENT:  Negative for hearing loss and tinnitus. Eyes:  Negative for blurred vision and double vision. Respiratory:  Negative for cough, shortness of breath and wheezing. Cardiovascular:  Negative for chest pain and palpitations. Gastrointestinal:  Negative for abdominal pain and heartburn. Genitourinary:  Negative for frequency and urgency. Musculoskeletal:  Negative for back pain and neck pain. Skin:  Negative for itching and rash. Neurological:  Positive for headaches. Negative for dizziness. Psychiatric/Behavioral:  Positive for memory loss. Negative for depression. Objective:   Visit Vitals  /73 (BP 1 Location: Left arm, BP Patient Position: Sitting)   Pulse 94   Ht 5' 4.5\" (1.638 m)   Wt 179 lb 11.2 oz (81.5 kg)   SpO2 98%   BMI 30.37 kg/m²     Body mass index is 30.37 kg/m². General:   Conversant, cooperative   Eyes:  Pupils equal and reactive, no nystagmus   Oropharynx:   Mallampati score IV, tongue scalloped, narrow posterior oral airway       Neck:   No carotid bruits; Neck circ. in \"inches\": 15   Chest/Lungs:  Clear on auscultation    CVS:  Normal rate, regular rhythm   Skin:  Warm to touch; no obvious rashes   Neuro:  Speech fluent, face symmetrical, tongue movement normal   Psych:  Normal affect,  normal countenance        Assessment:       ICD-10-CM ICD-9-CM    1. PERLA (obstructive sleep apnea)  G47.33 327.23 SLEEP STUDY UNATTENDED, 4 CHANNEL        History consistent with sleep disordered breathing.   Patient has a narrow posterior oral airway; potentially, sleep disordered breathing may be more prominent when supine, and/or in rem sleep. Plan:     Orders Placed This Encounter    SLEEP STUDY UNATTENDED, 4 CHANNEL     Order Specific Question:   Reason for Exam     Answer:   fatigue       * Patient has a history and examination consistent with the diagnosis of sleep apnea. * S Home leep testing was ordered for initial evaluation. * She was provided information on sleep apnea including corresponding risk factors and the importance of proper treatment. * Treatment options if indicated were reviewed today. Instructions:    Do not engage in activities requiring a normal degree of alertness if fatigue is present. The patient understands that untreated or undertreated sleep apnea could impair judgement and the ability to function normally during the day. Call or return if symptoms worsen or persist.          Chester See MD, FAASM  Electronically signed 07/26/22       This note was created using voice recognition software. Despite editing, there may be syntax errors. This note will not be viewable in [address]

## 2022-09-26 ENCOUNTER — HOSPITAL ENCOUNTER (OUTPATIENT)
Dept: SLEEP MEDICINE | Age: 62
Discharge: HOME | End: 2022-09-26
Payer: COMMERCIAL

## 2022-09-26 ENCOUNTER — OFFICE VISIT (OUTPATIENT)
Dept: SLEEP MEDICINE | Age: 62
End: 2022-09-26

## 2022-09-26 DIAGNOSIS — G47.33 OSA (OBSTRUCTIVE SLEEP APNEA): Primary | ICD-10-CM

## 2022-09-26 PROCEDURE — 95806 SLEEP STUDY UNATT&RESP EFFT: CPT | Performed by: SPECIALIST

## 2022-09-26 NOTE — PROGRESS NOTES
[address] [place]. [place], [address]  Tel.  [phone]  Fax. [address] [facility] [address]  [place], [address] [facility]  Tel.  [phone]  Fax. [phone] [address] [facility] [address]   Tel.  [phone]  Fax. [phone]       S>Phyllis Segal Ala is a 63 y.o. female seen today to receive a home sleep testing unit (HST). Patient was educated on proper hookup and operation of the HST. Instruction forms and documentation were reviewed and signed. The patient demonstrated good understanding of the HST.    O>    There were no vitals taken for this visit. A>  No diagnosis found. P>  General information regarding operations and maintenance of the device was provided. She was provided information on sleep apnea including coresponding risk factors and the importance of proper treatment. Follow-up appointment was made to return the HST. She will be contacted once the results have been reviewed. She was asked to contact our office for any problems regarding her home sleep test study.   Serial # [id number]

## 2022-09-28 ENCOUNTER — TELEPHONE (OUTPATIENT)
Dept: SLEEP MEDICINE | Age: 62
End: 2022-09-28

## 2022-09-28 DIAGNOSIS — G47.33 OSA (OBSTRUCTIVE SLEEP APNEA): Primary | ICD-10-CM

## 2022-09-29 NOTE — TELEPHONE ENCOUNTER
HSAT performed for potential sleep disordered breathing. Study demonstrated overall AHI of 10.9/h associated with minimal SaO2 of 70%. Events more prominently supine with a supine-related AHI 15.1/h. Snoring during 45.4%. Recommendation: APAP 6-16 cm    Sleep technologist: Please review HSAT results with the patient. Order has been entered for APAP 6-16 cm. Please schedule first compliance appointment.

## 2022-10-04 ENCOUNTER — TELEPHONE (OUTPATIENT)
Dept: SLEEP MEDICINE | Age: 62
End: 2022-10-04

## 2022-10-04 NOTE — TELEPHONE ENCOUNTER
Spoke with patient and provided the number to the [employer] company which is [employer] [phone] and she will call back to schedule her 1st adherence appointment.

## 2022-11-03 ENCOUNTER — TELEPHONE (OUTPATIENT)
Dept: SLEEP MEDICINE | Age: 62
End: 2022-11-03

## 2022-11-03 NOTE — TELEPHONE ENCOUNTER
Called and left a voicemail for patient to callback; patient need to schedule 1st adherence; received notice from [address] [facility] patient should be scheduled between 11/28/22-01/26/23.

## 2022-12-21 ENCOUNTER — OFFICE VISIT (OUTPATIENT)
Dept: SLEEP MEDICINE | Age: 62
End: 2022-12-21
Payer: COMMERCIAL

## 2022-12-21 VITALS
HEIGHT: 66 IN | HEART RATE: 71 BPM | BODY MASS INDEX: 28.85 KG/M2 | WEIGHT: 179.5 LBS | DIASTOLIC BLOOD PRESSURE: 66 MMHG | SYSTOLIC BLOOD PRESSURE: 106 MMHG | OXYGEN SATURATION: 97 %

## 2022-12-21 DIAGNOSIS — G47.33 OSA (OBSTRUCTIVE SLEEP APNEA): Primary | ICD-10-CM

## 2022-12-21 PROCEDURE — 99213 OFFICE O/P EST LOW 20 MIN: CPT | Performed by: SPECIALIST

## 2022-12-21 NOTE — PROGRESS NOTES
[address] [place]. [place], [address]  Tel.  [phone]  Fax. [address] [facility] 60  [place], [address] [facility]  Tel.  [phone]  Fax. [phone] [address] [facility]  Tel.  [phone]  Fax. [phone]         Chief Complaint       Chief Complaint   Patient presents with    Sleep Problem     1st adherence         HPI        Farhan Smith is a 63 y.o. female seen for follow-up. She was evaluated with a home sleep study which demonstrated sleep disordered breathing characterized by overall AHI of 10.9/h associated with minimal SaO2 of 70%. Events more prominently supine with a supine-related AHI 15.1/h. Snoring during 45.4%. APAP 6-16 cm initiated. Compliance data downloaded and reviewed in detail with the patient today. During the past 30 days, APAP used during 30 days with the average daily use of 6.75 hours. CMS compliance criteria 100%. AHI 3.4 per hour. AHI variability noted. With APAP patient is not experiencing significant nocturnal awakening, nonrestorative sleep or excessive daytime sleepiness. Describes her self is having \"more energy\". Is not fatigued on awakening. [place] Sleepiness Scale: 8    Allergies   Allergen Reactions    Latex Rash     Latex gloves       Current Outpatient Medications   Medication Sig Dispense Refill    multivitamin with iron (DAILY MULTIVITAMINS/IRON PO)       chlorpheniramine/pseudoephed (SUPHEDRINE COLD-ALLERGY PO)       omeprazole (PRILOSEC) 10 mg capsule       ibuprofen (MOTRIN) 200 mg tablet Take  by mouth.      multivitamin, tx-iron-ca-min (THERA-M w/ IRON) 9 mg iron-400 mcg tab tablet Take 1 Tablet by mouth daily.       cholecalciferol (VITAMIN D3) (2,000 UNITS /50 MCG) cap capsule           She  has a past medical history of Arthritis, Diverticular disease (11/10/2020), GERD (gastroesophageal reflux disease) (11/10/2020), Impaired fasting blood sugar (11/10/2020), Irritable bowel syndrome (11/10/2020), Osteoarthritis (arthritis due to wear and tear of joints), and Overweight (11/10/2020). She  has a past surgical history that includes hx gi; hx hernia repair; and hx gyn. She family history includes Cancer in her maternal grandmother; Diabetes in her father, mother, and paternal grandfather; Hypertension in her mother and sister. She  reports that she has never smoked. She has never used smokeless tobacco.     Review of Systems:  Unchanged per patient      Objective:   Visit Vitals  /66 (BP 1 Location: Left upper arm, BP Patient Position: Sitting)   Pulse 71   Ht 5' 6\" (1.676 m)   Wt 179 lb 8 oz (81.4 kg)   SpO2 97%   BMI 28.97 kg/m²     Body mass index is 28.97 kg/m². General:   Conversant, cooperative   Eyes:  no nystagmus   Oropharynx:   Mallampati score IV, tongue scalloped                         Neuro:  Speech fluent, face symmetrical             Assessment:       ICD-10-CM ICD-9-CM    1. PERLA (obstructive sleep apnea)  G47.33 327.23 AMB SUPPLY ORDER          she is compliant with PAP therapy and PAP continues to benefit patient and remains necessary for control of her sleep apnea. Some AHI variability noted. Pressure will be changed to 8-18 cm. Remote compliance review in 4 weeks. Plan:     Orders Placed This Encounter    AMB SUPPLY ORDER     Diagnosis: Obstructive Sleep Apnea ICD-10 Code (G47.33)    CPAP mask and supplies-  Patient preference, headgear, heated tubing, and filter;  heated humidifier.  Oral/Nasal Combo Mask 1 every 3 months.  Oral Cushion Combo Mask (Replace) 2 per month.  Nasal Pillows Combo Mask (Replace) 2 per month.  Full Face Mask 1 every 3 months.  Full Face Mask Cushion 1 per month.  Nasal Cushion (Replace) 2 per month.  Nasal Pillows (Replace) 2 per month.  Nasal Interface Mask 1 every 3 months.  Headgear 1 every 6 months.  Chinstrap 1 every 6 months.    Tubing 1 every 3 months.  Filter(s) Disposable 2 per month.  Filter(s) Non-Disposable 1 every 6 months.  Oral Interface 1 every 3 months. [address] for Christian Espinosa (Replace) 1 every 6 months.  Tubing with heating element 1 every 3 months. Ruth Briggs MD, FAASM  Diplomate, American Board of Sleep Medicine  NPI [id number]  Electronically signed 12/21/22       *A copy of compliance data was provided to the patient and reviewed in detail. *CPAP will be continued at the above pressure settings. The patient is to contact the office if there are problems with either mask or pressure settings. Follow-up will be scheduled at which time compliance data will be reviewed. * Patient has a history and examination consistent with the diagnosis of sleep apnea. * She was provided information on sleep apnea including corresponding risk factors and the importance of proper treatment. * Treatment options if indicated were reviewed today. Ruth Briggs MD, FAASM  Electronically signed 12/21/22        This note was created using voice recognition software. Despite editing, there may be syntax errors. This note will not be viewable in [address]

## 2022-12-22 ENCOUNTER — DOCUMENTATION ONLY (OUTPATIENT)
Dept: SLEEP MEDICINE | Age: 62
End: 2022-12-22

## 2023-01-04 ENCOUNTER — OFFICE VISIT (OUTPATIENT)
Dept: SLEEP MEDICINE | Age: 63
End: 2023-01-04

## 2023-01-04 ENCOUNTER — TELEPHONE (OUTPATIENT)
Dept: SLEEP MEDICINE | Age: 63
End: 2023-01-04

## 2023-01-04 DIAGNOSIS — G47.33 OSA (OBSTRUCTIVE SLEEP APNEA): Primary | ICD-10-CM

## 2023-01-04 NOTE — TELEPHONE ENCOUNTER
Returned patient's call from [employer]; patient is having problems with machine since pressure change in office on 12/21; due to [facility] closure scheduled patient to see Tech at [facility] to discuss the pressure change and now to check about leaks because patient believes she is experiencing leaks due it not being sealed properly as well and wanted it to be checked. Patient is upset because she went to [facility] and was not helped but was told she need to contact the medical equipment company she got the device from because her machine is broke. Patient stated she had no problems with machine before any changes were made. Left a message for patient to call [facility] back to discuss other options of possibly seeing a Tech from [facility].

## 2023-01-04 NOTE — PROGRESS NOTES
Patient received [employer] device in October and now the start/stop button isn't working correctly, the autostart on/off and now pt has been experience leaks that she has not had before. Patient advised that the device is under warranty and to contact the DME to assist with device not working.

## 2023-01-05 ENCOUNTER — TELEPHONE (OUTPATIENT)
Dept: SLEEP MEDICINE | Age: 63
End: 2023-01-05

## 2023-01-05 NOTE — TELEPHONE ENCOUNTER
Received voicemail message from patient that she has an appointment tomorrow, 1/6/2023 1:30 pm with medical equipment company to address her PAP device issues.

## 2023-01-18 ENCOUNTER — OFFICE VISIT (OUTPATIENT)
Dept: SLEEP MEDICINE | Age: 63
End: 2023-01-18

## 2023-01-18 DIAGNOSIS — G47.33 OSA (OBSTRUCTIVE SLEEP APNEA): Primary | ICD-10-CM

## 2023-05-16 RX ORDER — IBUPROFEN 200 MG
TABLET ORAL
COMMUNITY

## 2023-05-16 RX ORDER — OMEPRAZOLE 10 MG/1
CAPSULE, DELAYED RELEASE ORAL
COMMUNITY

## 2023-07-28 ENCOUNTER — TRANSCRIBE ORDERS (OUTPATIENT)
Facility: HOSPITAL | Age: 63
End: 2023-07-28

## 2023-07-28 DIAGNOSIS — R07.9 CHEST PAIN, UNSPECIFIED TYPE: ICD-10-CM

## 2023-07-28 DIAGNOSIS — R11.0 NAUSEA: ICD-10-CM

## 2023-07-28 DIAGNOSIS — Z86.010 HISTORY OF ADENOMATOUS POLYP OF COLON: ICD-10-CM

## 2023-07-28 DIAGNOSIS — R10.32 LLQ ABDOMINAL PAIN: ICD-10-CM

## 2023-07-28 DIAGNOSIS — K21.9 GASTROESOPHAGEAL REFLUX DISEASE, UNSPECIFIED WHETHER ESOPHAGITIS PRESENT: Primary | ICD-10-CM

## 2023-07-28 DIAGNOSIS — K44.9 HIATAL HERNIA: ICD-10-CM

## 2023-08-15 ENCOUNTER — HOSPITAL ENCOUNTER (OUTPATIENT)
Facility: HOSPITAL | Age: 63
Discharge: HOME | End: 2023-08-18
Attending: INTERNAL MEDICINE
Payer: COMMERCIAL

## 2023-08-15 DIAGNOSIS — Z86.010 HISTORY OF ADENOMATOUS POLYP OF COLON: ICD-10-CM

## 2023-08-15 DIAGNOSIS — K44.9 HIATAL HERNIA: ICD-10-CM

## 2023-08-15 DIAGNOSIS — K21.9 CHRONIC GASTROESOPHAGEAL REFLUX DISEASE: ICD-10-CM

## 2023-08-15 DIAGNOSIS — R07.9 CHEST PAIN, UNSPECIFIED TYPE: ICD-10-CM

## 2023-08-15 DIAGNOSIS — R10.32 LLQ ABDOMINAL PAIN: ICD-10-CM

## 2023-08-15 DIAGNOSIS — R11.0 NAUSEA: ICD-10-CM

## 2023-08-15 PROCEDURE — 74220 X-RAY XM ESOPHAGUS 1CNTRST: CPT

## 2023-11-14 ENCOUNTER — TELEPHONE (OUTPATIENT)
Facility: CLINIC | Age: 63
End: 2023-11-14

## 2024-01-15 ASSESSMENT — SLEEP AND FATIGUE QUESTIONNAIRES
HOW LIKELY ARE YOU TO NOD OFF OR FALL ASLEEP WHILE SITTING AND READING: SLIGHT CHANCE OF DOZING
HOW LIKELY ARE YOU TO NOD OFF OR FALL ASLEEP WHEN YOU ARE A PASSENGER IN A CAR FOR AN HOUR WITHOUT A BREAK: WOULD NEVER DOZE
HOW LIKELY ARE YOU TO NOD OFF OR FALL ASLEEP WHILE SITTING INACTIVE IN A PUBLIC PLACE: SLIGHT CHANCE OF DOZING
HOW LIKELY ARE YOU TO NOD OFF OR FALL ASLEEP WHILE WATCHING TV: 1
HOW LIKELY ARE YOU TO NOD OFF OR FALL ASLEEP WHILE SITTING AND TALKING TO SOMEONE: WOULD NEVER DOZE
HAS YOUR MOOD BEEN AFFECTED BECAUSE YOU ARE SLEEPY OR TIRED: YES, LITTLE
HOW LIKELY ARE YOU TO NOD OFF OR FALL ASLEEP IN A CAR, WHILE STOPPED FOR A FEW MINUTES IN TRAFFIC: 0
ESS TOTAL SCORE: 4
DO YOU HAVE DIFFICULTY BEING AS ACTIVE AS YOU WANT TO BE IN THE MORNING BECAUSE YOU ARE SLEEPY OR TIRED: YES, LITTLE
HOW LIKELY ARE YOU TO NOD OFF OR FALL ASLEEP WHILE WATCHING TV: SLIGHT CHANCE OF DOZING
HAS YOUR RELATIONSHIP WITH FAMILY, FRIENDS OR WORK COLLEAGUES BEEN AFFECTED BECAUSE YOU ARE SLEEPY OR TIRED: NO
HOW LIKELY ARE YOU TO NOD OFF OR FALL ASLEEP WHILE LYING DOWN TO REST IN THE AFTERNOON WHEN CIRCUMSTANCES PERMIT: 1
HOW LIKELY ARE YOU TO NOD OFF OR FALL ASLEEP WHILE SITTING AND TALKING TO SOMEONE: 0
HOW LIKELY ARE YOU TO NOD OFF OR FALL ASLEEP WHILE SITTING QUIETLY AFTER LUNCH WITHOUT ALCOHOL: WOULD NEVER DOZE
HOW LIKELY ARE YOU TO NOD OFF OR FALL ASLEEP WHILE SITTING QUIETLY AFTER LUNCH WITHOUT ALCOHOL: 0
HOW LIKELY ARE YOU TO NOD OFF OR FALL ASLEEP WHILE SITTING AND READING: 1
HOW LIKELY ARE YOU TO NOD OFF OR FALL ASLEEP WHILE LYING DOWN TO REST IN THE AFTERNOON WHEN CIRCUMSTANCES PERMIT: SLIGHT CHANCE OF DOZING
DO YOU HAVE DIFFICULTY CONCENTRATING ON THE THINGS YOU DO BECAUSE YOU ARE SLEEPY OR TIRED: YES, A LITTLE
HOW LIKELY ARE YOU TO NOD OFF OR FALL ASLEEP WHEN YOU ARE A PASSENGER IN A CAR FOR AN HOUR WITHOUT A BREAK: 0
DO YOU HAVE DIFFICULTY OPERATING A MOTOR VEHICLE FOR SHORT DISTANCES (LESS THAN 100 MILES) BECAUSE YOU BECOME SLEEPY: NO
DO YOU HAVE DIFFICULTY VISITING YOUR FAMILY OR FRIENDS IN THEIR HOME BECAUSE YOU BECOME SLEEPY OR TIRED: NO
DO YOU HAVE DIFFICULTY BEING AS ACTIVE AS YOU WANT TO BE IN THE EVENING BECAUSE YOU ARE SLEEPY OR TIRED: YES, LITTLE
FOSQ SCORE: 17
DO YOU HAVE DIFFICULTY WATCHING A MOVIE OR VIDEO BECAUSE YOU BECOME SLEEPY OR TIRED: YES, A LITTLE
DO YOU HAVE DIFFICULTY OPERATING A MOTOR VEHICLE FOR LONG DISTANCES (GREATER THAN 100 MILES) BECAUSE YOU BECOME SLEEPY: NO
DO YOU GENERALLY HAVE DIFFICULTY REMEMBERING THINGS BECAUSE YOU ARE SLEEPY OR TIRED: YES, A LITTLE
HOW LIKELY ARE YOU TO NOD OFF OR FALL ASLEEP IN A CAR, WHILE STOPPED FOR A FEW MINUTES IN TRAFFIC: WOULD NEVER DOZE
HOW LIKELY ARE YOU TO NOD OFF OR FALL ASLEEP WHILE SITTING INACTIVE IN A PUBLIC PLACE: 1

## 2024-01-18 ENCOUNTER — TELEMEDICINE (OUTPATIENT)
Age: 64
End: 2024-01-18
Payer: COMMERCIAL

## 2024-01-18 DIAGNOSIS — G47.33 OSA (OBSTRUCTIVE SLEEP APNEA): Primary | ICD-10-CM

## 2024-01-18 PROCEDURE — 99212 OFFICE O/P EST SF 10 MIN: CPT | Performed by: SPECIALIST

## 2024-01-18 RX ORDER — PANTOPRAZOLE SODIUM 40 MG/1
40 TABLET, DELAYED RELEASE ORAL DAILY
COMMUNITY
Start: 2023-07-18

## 2024-01-18 RX ORDER — CALCIUM CITRATE/VITAMIN D3 200MG-6.25
TABLET ORAL
COMMUNITY

## 2024-01-18 NOTE — PROGRESS NOTES
[facility] - [address]  [facility] - [place]  [address]  [place] [address]  Dept: [phone]              [address]  [place] [address]  Tel.  [phone]  Fax. [phone] [address]  [place] [address]  Tel.  [phone]  Fax. [phone] [address] [place] [address]  Tel.  [phone]  Fax. [phone]     Mireille Li is a 63 y.o. female who was seen by synchronous (real-time) audio-video technology on 1/18/2024.      Consent:  She and/or her healthcare decision maker is aware that this patient-initiated Telehealth encounter is a billable service, with coverage as determined by her insurance carrier. She is aware that she may receive a bill and has provided verbal consent to proceed: Yes    I was in the office while conducting this encounter. Patient in [place].     Chief Complaint       Chief Complaint   Patient presents with    Sleep Problem     Yearly follow up         HPI        Mireille Li is a 63 y.o. female seen for follow-up. She was evaluated with a home sleep study which demonstrated sleep disordered breathing characterized by overall AHI of 10.9/h associated with minimal SaO2 of 70%.  Events more prominently  supine with a supine-related AHI 15.1/h.  Snoring during 45.4%.      APAP 6-16 cm initiated.  Compliance review demonstrated AHI variability.  Pressure was changed to 8-18 cm.    Compliance data downloaded and reviewed in detail with the patient today. During the past 30 days, APAP used during 30 days with the average daily use of 7.65 hours. CMS compliance criteria 100%. AHI 2.7 per hour.     With APAP is not experiencing significant nocturnal awakening, nonrestorative sleep or excessive daytime sleepiness.    [place] Sleepiness Scale: 4    Allergies   Allergen Reactions    Latex Rash     Latex gloves    Shrimp (Diagnostic) Anaphylaxis and Other (See

## 2024-01-22 ENCOUNTER — CLINICAL DOCUMENTATION (OUTPATIENT)
Age: 64
End: 2024-01-22

## 2024-01-24 ENCOUNTER — OFFICE VISIT (OUTPATIENT)
Facility: CLINIC | Age: 64
End: 2024-01-24
Payer: COMMERCIAL

## 2024-01-24 VITALS
DIASTOLIC BLOOD PRESSURE: 82 MMHG | OXYGEN SATURATION: 99 % | TEMPERATURE: 97.9 F | HEART RATE: 82 BPM | WEIGHT: 179 LBS | RESPIRATION RATE: 16 BRPM | HEIGHT: 66 IN | SYSTOLIC BLOOD PRESSURE: 116 MMHG | BODY MASS INDEX: 28.77 KG/M2

## 2024-01-24 DIAGNOSIS — Z00.00 WELL WOMAN EXAM (NO GYNECOLOGICAL EXAM): Primary | ICD-10-CM

## 2024-01-24 DIAGNOSIS — R73.01 IMPAIRED FASTING GLUCOSE: ICD-10-CM

## 2024-01-24 DIAGNOSIS — M77.11 LATERAL EPICONDYLITIS OF RIGHT ELBOW: ICD-10-CM

## 2024-01-24 DIAGNOSIS — R06.09 DOE (DYSPNEA ON EXERTION): ICD-10-CM

## 2024-01-24 PROCEDURE — 99214 OFFICE O/P EST MOD 30 MIN: CPT | Performed by: FAMILY MEDICINE

## 2024-01-24 PROCEDURE — 99396 PREV VISIT EST AGE 40-64: CPT | Performed by: FAMILY MEDICINE

## 2024-01-24 RX ORDER — NAPROXEN 500 MG/1
500 TABLET ORAL 2 TIMES DAILY WITH MEALS
Qty: 30 TABLET | Refills: 0 | Status: SHIPPED | OUTPATIENT
Start: 2024-01-24

## 2024-01-24 SDOH — ECONOMIC STABILITY: INCOME INSECURITY: HOW HARD IS IT FOR YOU TO PAY FOR THE VERY BASICS LIKE FOOD, HOUSING, MEDICAL CARE, AND HEATING?: NOT HARD AT ALL

## 2024-01-24 SDOH — ECONOMIC STABILITY: HOUSING INSECURITY
IN THE LAST 12 MONTHS, WAS THERE A TIME WHEN YOU DID NOT HAVE A STEADY PLACE TO SLEEP OR SLEPT IN A SHELTER (INCLUDING NOW)?: NO

## 2024-01-24 SDOH — ECONOMIC STABILITY: FOOD INSECURITY: WITHIN THE PAST 12 MONTHS, YOU WORRIED THAT YOUR FOOD WOULD RUN OUT BEFORE YOU GOT MONEY TO BUY MORE.: NEVER TRUE

## 2024-01-24 SDOH — ECONOMIC STABILITY: FOOD INSECURITY: WITHIN THE PAST 12 MONTHS, THE FOOD YOU BOUGHT JUST DIDN'T LAST AND YOU DIDN'T HAVE MONEY TO GET MORE.: NEVER TRUE

## 2024-01-24 ASSESSMENT — PATIENT HEALTH QUESTIONNAIRE - PHQ9
SUM OF ALL RESPONSES TO PHQ9 QUESTIONS 1 & 2: 0
1. LITTLE INTEREST OR PLEASURE IN DOING THINGS: 0
SUM OF ALL RESPONSES TO PHQ QUESTIONS 1-9: 0
2. FEELING DOWN, DEPRESSED OR HOPELESS: 0
SUM OF ALL RESPONSES TO PHQ QUESTIONS 1-9: 0

## 2024-01-24 NOTE — PROGRESS NOTES
(arthritis due to wear and tear of joints)     knees    Overweight 11/10/2020    Sleep apnea in adult       Social History     Socioeconomic History    Marital status:      Spouse name: None    Number of children: None    Years of education: None    Highest education level: None   Tobacco Use    Smoking status: Never    Smokeless tobacco: Never     Social Determinants of Health     Financial Resource Strain: Low Risk  (1/24/2024)    Overall Financial Resource Strain (CARDIA)     Difficulty of Paying Living Expenses: Not hard at all   Food Insecurity: No Food Insecurity (1/24/2024)    Hunger Vital Sign     Worried About Running Out of Food in the Last Year: Never true     Ran Out of Food in the Last Year: Never true   Transportation Needs: Unknown (1/24/2024)    PRAPARE - Transportation     Lack of Transportation (Non-Medical): No   Physical Activity: Insufficiently Active (6/2/2022)    Exercise Vital Sign     Days of Exercise per Week: 1 day     Minutes of Exercise per Session: 30 min   Intimate Partner Violence: Not At Risk (6/2/2022)    Humiliation, Afraid, Rape, and Kick questionnaire     Fear of Current or Ex-Partner: No     Emotionally Abused: No     Physically Abused: No     Sexually Abused: No   Housing Stability: Unknown (1/24/2024)    Housing Stability Vital Sign     Unstable Housing in the Last Year: No        Review of Systems - negative except as listed above      Objective:     Vitals:    01/24/24 1506   BP: 116/82   Pulse: 82   Resp: 16   Temp: 97.9 °F (36.6 °C)   SpO2: 99%   Weight: 81.2 kg (179 lb)   Height: 1.676 m (5' 6\")       Physical Examination: General appearance - alert, well appearing, and in no distress  Eyes - pupils equal and reactive, extraocular eye movements intact  Ears - bilateral TM's and external ear canals normal  Nose - normal and patent, no erythema, discharge or polyps  Mouth - mucous membranes moist, pharynx normal without lesions  Neck - supple, no significant

## 2024-01-26 LAB
ALBUMIN SERPL-MCNC: 4.5 G/DL (ref 3.9–4.9)
ALBUMIN/GLOB SERPL: 1.5 {RATIO} (ref 1.2–2.2)
ALP SERPL-CCNC: 110 IU/L (ref 44–121)
ALT SERPL-CCNC: 15 IU/L (ref 0–32)
AST SERPL-CCNC: 16 IU/L (ref 0–40)
BASOPHILS # BLD AUTO: 0 X10E3/UL (ref 0–0.2)
BASOPHILS NFR BLD AUTO: 1 %
BILIRUB SERPL-MCNC: 0.3 MG/DL (ref 0–1.2)
BUN SERPL-MCNC: 8 MG/DL (ref 8–27)
BUN/CREAT SERPL: 10 (ref 12–28)
CALCIUM SERPL-MCNC: 10.1 MG/DL (ref 8.7–10.3)
CHLORIDE SERPL-SCNC: 101 MMOL/L (ref 96–106)
CHOLEST SERPL-MCNC: 232 MG/DL (ref 100–199)
CO2 SERPL-SCNC: 24 MMOL/L (ref 20–29)
CREAT SERPL-MCNC: 0.77 MG/DL (ref 0.57–1)
EGFRCR SERPLBLD CKD-EPI 2021: 88 ML/MIN/1.73
EOSINOPHIL # BLD AUTO: 0.1 X10E3/UL (ref 0–0.4)
EOSINOPHIL NFR BLD AUTO: 2 %
ERYTHROCYTE [DISTWIDTH] IN BLOOD BY AUTOMATED COUNT: 14.2 % (ref 11.7–15.4)
GLOBULIN SER CALC-MCNC: 3 G/DL (ref 1.5–4.5)
GLUCOSE SERPL-MCNC: 102 MG/DL (ref 70–99)
HBA1C MFR BLD: 6.5 % (ref 4.8–5.6)
HCT VFR BLD AUTO: 41.6 % (ref 34–46.6)
HDLC SERPL-MCNC: 63 MG/DL
HGB BLD-MCNC: 12.8 G/DL (ref 11.1–15.9)
HIV 1+2 AB+HIV1 P24 AG SERPL QL IA: NON REACTIVE
IMM GRANULOCYTES # BLD AUTO: 0 X10E3/UL (ref 0–0.1)
IMM GRANULOCYTES NFR BLD AUTO: 0 %
LDLC SERPL CALC-MCNC: 139 MG/DL (ref 0–99)
LYMPHOCYTES # BLD AUTO: 1.9 X10E3/UL (ref 0.7–3.1)
LYMPHOCYTES NFR BLD AUTO: 43 %
MCH RBC QN AUTO: 27.4 PG (ref 26.6–33)
MCHC RBC AUTO-ENTMCNC: 30.8 G/DL (ref 31.5–35.7)
MCV RBC AUTO: 89 FL (ref 79–97)
MONOCYTES # BLD AUTO: 0.3 X10E3/UL (ref 0.1–0.9)
MONOCYTES NFR BLD AUTO: 7 %
NEUTROPHILS # BLD AUTO: 2.1 X10E3/UL (ref 1.4–7)
NEUTROPHILS NFR BLD AUTO: 47 %
PLATELET # BLD AUTO: 348 X10E3/UL (ref 150–450)
POTASSIUM SERPL-SCNC: 4 MMOL/L (ref 3.5–5.2)
PROT SERPL-MCNC: 7.5 G/DL (ref 6–8.5)
RBC # BLD AUTO: 4.68 X10E6/UL (ref 3.77–5.28)
SODIUM SERPL-SCNC: 140 MMOL/L (ref 134–144)
TRIGL SERPL-MCNC: 168 MG/DL (ref 0–149)
VLDLC SERPL CALC-MCNC: 30 MG/DL (ref 5–40)
WBC # BLD AUTO: 4.4 X10E3/UL (ref 3.4–10.8)

## 2024-02-13 ENCOUNTER — TELEPHONE (OUTPATIENT)
Age: 64
End: 2024-02-13

## 2024-02-13 NOTE — TELEPHONE ENCOUNTER
----- Message from Imani Quintana sent at 2/12/2024  5:20 PM EST -----  Regarding: FW: Awaken chest pressure which resolved within probably 2mins  Contact: [phone]    ----- Message -----  From: Mireille Li  Sent: 2/12/2024   1:14 PM EST  To: [facility] Clinical Staff  Subject: Awaken chest pressure which resolved within #    Is it related to my Cpap and high pressures during sleep  At the moment 1:00 having some belching  ? My gerd not Cpap related

## 2024-02-13 NOTE — TELEPHONE ENCOUNTER
Most recent 30 day download available in media. Will defer to Dr. Eisenberg for further recommendations.

## 2024-02-14 ENCOUNTER — TELEPHONE (OUTPATIENT)
Age: 64
End: 2024-02-14

## 2024-02-14 NOTE — TELEPHONE ENCOUNTER
Patient called stating that she is having chest pains and back pain in the middle of night while using her device. I spoke to the technician and he stated that they are going to drop the pressure on her device starting tonight and she understood. If the patient has anymore concerns to call the office.

## 2024-03-26 ENCOUNTER — OFFICE VISIT (OUTPATIENT)
Age: 64
End: 2024-03-26
Payer: COMMERCIAL

## 2024-03-26 VITALS
HEART RATE: 102 BPM | WEIGHT: 175.2 LBS | SYSTOLIC BLOOD PRESSURE: 122 MMHG | DIASTOLIC BLOOD PRESSURE: 80 MMHG | BODY MASS INDEX: 28.16 KG/M2 | HEIGHT: 66 IN | OXYGEN SATURATION: 96 % | RESPIRATION RATE: 18 BRPM

## 2024-03-26 DIAGNOSIS — R06.02 SHORTNESS OF BREATH: ICD-10-CM

## 2024-03-26 DIAGNOSIS — E78.2 MIXED DYSLIPIDEMIA: ICD-10-CM

## 2024-03-26 DIAGNOSIS — R07.9 CHEST PAIN, UNSPECIFIED TYPE: Primary | ICD-10-CM

## 2024-03-26 PROCEDURE — 99204 OFFICE O/P NEW MOD 45 MIN: CPT | Performed by: SPECIALIST

## 2024-03-26 PROCEDURE — 93000 ELECTROCARDIOGRAM COMPLETE: CPT | Performed by: SPECIALIST

## 2024-03-26 NOTE — PROGRESS NOTES
Chief Complaint   Patient presents with    New Patient    Chest Pain    Other     NITHYA     Vitals:    03/26/24 0832   BP: 122/80   Site: Left Upper Arm   Position: Sitting   Cuff Size: Medium Adult   Pulse: (!) 102   Resp: 18   SpO2: 96%   Weight: 79.5 kg (175 lb 3.2 oz)   Height: 1.676 m (5' 6\")     Reports a 5 out of 10 chest pain in the middle of the chest  No SOB- States it is like a \"burning\" feeling  No recent hospitalizations/urgent care visits  No refills needed    Referred by DR Benjamin for CP

## 2024-03-26 NOTE — PROGRESS NOTES
Odette Black MD. [facility]          Patient: Mireille Li  : 1960      Today's Date: 3/26/2024        HISTORY OF PRESENT ILLNESS:     History of Present Illness:  Per Dr. Benjamin: \"she is a 63 y.o. year old female who presents for evaluation of shortness of breath over the last several months.  Patient states that when she is going up her stairs which is about 16 steps she feels that she is winded with the last episode happening about 3 weeks ago.  States that she had COVID back in 2023 which is mild to moderate in nature and minimal lung symptoms.  Patient is concerned that she has had some scarring in her lungs.  Denies any symptoms with exercise.  Currently no chest pain.\"    She does endorse some chest pressure, which does not seem to be related to exertion. Nothing that makes it better, just comes and goes.    Chest xray was normal. Has hx of GERD, s/p EGD recently, reports it showed mild GERD. No fam hx of MI, CAD, but does have fam hx of HTN.    PAST MEDICAL HISTORY:     Past Medical History:   Diagnosis Date    Arthritis     Diverticular disease 11/10/2020    GERD (gastroesophageal reflux disease) 11/10/2020    Impaired fasting blood sugar 11/10/2020    Irritable bowel syndrome 11/10/2020    Osteoarthritis (arthritis due to wear and tear of joints)     knees    Overweight 11/10/2020    Sleep apnea in adult        Past Surgical History:   Procedure Laterality Date    CARPAL TUNNEL RELEASE      GI      GYN      HERNIA REPAIR         CURRENT MEDICATIONS:    .  Current Outpatient Medications   Medication Sig Dispense Refill    pantoprazole (PROTONIX) 40 MG tablet Take 1 tablet by mouth daily      ibuprofen (ADVIL;MOTRIN) 200 MG tablet Take by mouth      naproxen (NAPROSYN) 500 MG tablet Take 1 tablet by mouth 2 times daily (with meals) 30 tablet 0    Calcium-Phosphorus-Vitamin D (CALCIUM/D3 ADULT GUMMIES) 200-96.6-200 MG-MG-UNIT CHEW        No current facility-administered medications for

## 2024-03-26 NOTE — PATIENT INSTRUCTIONS
serving is 1 slice of bread, 1 ounce of dry cereal, or 1/2 cup of cooked rice, pasta, or cooked cereal. Try to choose whole-grain products as much as possible.  Limit lean meat, poultry, and fish to 6 ounces or less each day. One egg counts as 1 ounce.  Eat 4 to 5 servings of nuts, seeds, and legumes (cooked dried beans, lentils, and split peas) each week. A serving is 1/3 cup of nuts, 2 tablespoons of seeds, 2 tablespoons of peanut butter, or 1/2 cup of cooked beans or peas.  Limit fats and oils to 2 to 3 servings each day. A serving is 1 teaspoon of vegetable oil or 2 tablespoons of salad dressing.  Limit sweets and added sugars to 5 servings or less a week. A serving is 1 tablespoon jelly or jam, 1/2 cup sorbet, or 1 cup of lemonade.  Eat less than 2,300 milligrams (mg) of sodium a day. If you limit your sodium to 1,500 mg a day, you can lower your blood pressure even more.  Be aware that all of these are the suggested number of servings for people who eat 1,800 to 2,000 calories a day. Your recommended number of servings may be different if you need more or fewer calories.  Tips for success  Start small. Make small changes, and stick with them. Once those changes become habit, add a few more changes.  Try some of the following:  Make it a goal to eat a fruit or vegetable at every meal and at snacks. This will make it easy to get the recommended amount of fruits and vegetables each day.  Try yogurt topped with fruit and nuts for a snack or healthy dessert.  Add lettuce, tomato, cucumber, and onion to sandwiches.  Have a variety of cut-up vegetables with a low-fat dip as an appetizer instead of chips and dip.  Sprinkle sunflower seeds or chopped almonds over salads. Or try adding chopped walnuts or almonds to cooked vegetables.  Try some vegetarian meals using beans and peas. Add garbanzo or kidney beans to salads. Make burritos and tacos with mashed doshi beans or black beans.  Where can you learn more?  Go to

## 2024-03-26 NOTE — PROGRESS NOTES
Orders for Echo and treadmill stress test for CP, RAZA    CT heart Scan     See Ragini in 1 month per Dr. Black's VO.

## 2024-03-28 ENCOUNTER — TELEPHONE (OUTPATIENT)
Age: 64
End: 2024-03-28

## 2024-03-28 DIAGNOSIS — E78.5 DYSLIPIDEMIA: Primary | ICD-10-CM

## 2024-03-28 NOTE — TELEPHONE ENCOUNTER
Patient explained that she was told to schedule a CT Scan but a order is needed before the patient can schedule. Patient request a message to be left on voicemail when order is placed.     Patient # [phone]

## 2024-04-01 ENCOUNTER — TELEPHONE (OUTPATIENT)
Age: 64
End: 2024-04-01

## 2024-04-01 NOTE — TELEPHONE ENCOUNTER
Patient called to have the order for the heart scan special $129 be put in, and to please call and leave a message that it's done.  Thanks!    Patient # [phone]

## 2024-05-02 ENCOUNTER — HOSPITAL ENCOUNTER (OUTPATIENT)
Facility: HOSPITAL | Age: 64
Discharge: HOME | End: 2024-05-02
Attending: SPECIALIST

## 2024-05-02 DIAGNOSIS — E78.5 DYSLIPIDEMIA: ICD-10-CM

## 2024-05-02 PROCEDURE — 75571 CT HRT W/O DYE W/CA TEST: CPT

## 2024-05-03 NOTE — CARDIO/PULMONARY
Reached patient at her given mobile number and shared her coronary artery CT score of zero with her.  Discussed the meaning of this score.  Patient has no further questions at this time.

## 2024-05-09 ENCOUNTER — OFFICE VISIT (OUTPATIENT)
Age: 64
End: 2024-05-09
Payer: COMMERCIAL

## 2024-05-09 VITALS
BODY MASS INDEX: 27.55 KG/M2 | WEIGHT: 171.4 LBS | DIASTOLIC BLOOD PRESSURE: 72 MMHG | SYSTOLIC BLOOD PRESSURE: 110 MMHG | RESPIRATION RATE: 16 BRPM | HEIGHT: 66 IN | HEART RATE: 83 BPM | OXYGEN SATURATION: 97 %

## 2024-05-09 DIAGNOSIS — R07.9 CHEST PAIN, UNSPECIFIED TYPE: Primary | ICD-10-CM

## 2024-05-09 DIAGNOSIS — E78.5 DYSLIPIDEMIA: ICD-10-CM

## 2024-05-09 PROCEDURE — 99214 OFFICE O/P EST MOD 30 MIN: CPT

## 2024-05-09 NOTE — PROGRESS NOTES
had concerns including Chest Pain, dyslipedemia, and Shortness of Breath.    Vitals:    05/09/24 1324   BP: 110/72   Site: Left Upper Arm   Position: Sitting   Pulse: 83   Resp: 16   SpO2: 97%   Weight: 77.7 kg (171 lb 6.4 oz)   Height: 1.676 m (5' 6\")        Chest pain No    Refills No        1. Have you been to the ER, urgent care clinic since your last visit? No       Hospitalized since your last visit? No       Where?        Date?  
pantoprazole (PROTONIX) 40 MG tablet Take 1 tablet by mouth daily      ibuprofen (ADVIL;MOTRIN) 200 MG tablet Take by mouth      naproxen (NAPROSYN) 500 MG tablet Take 1 tablet by mouth 2 times daily (with meals) (Patient not taking: Reported on 5/9/2024) 30 tablet 0    Calcium-Phosphorus-Vitamin D (CALCIUM/D3 ADULT GUMMIES) 200-96.6-200 MG-MG-UNIT CHEW  (Patient not taking: Reported on 5/9/2024)       No current facility-administered medications for this visit.       Allergies   Allergen Reactions    Latex Rash     Latex gloves    Shrimp (Diagnostic) Anaphylaxis and Other (See Comments)       SOCIAL HISTORY:     Social History     Tobacco Use    Smoking status: Never    Smokeless tobacco: Never       FAMILY HISTORY:     Family History   Problem Relation Age of Onset    Diabetes Paternal Grandfather     Cancer Maternal Grandmother     Hypertension Sister     Diabetes Father     Diabetes Mother     Hypertension Mother        REVIEW OF SYMPTOMS:     Review of Symptoms:  Constitutional: Negative for fever, chills  HEENT: Negative for nosebleeds, tinnitus, and vision changes.   Respiratory: Negative for cough, wheezing  Cardiovascular: Negative for orthopnea, claudication, syncope  Gastrointestinal: Negative for abdominal pain, diarrhea, melena.   Genitourinary: Negative for dysuria  Musculoskeletal: Negative for myalgias.   Skin: Negative for rash  Heme: No problems bleeding.  Neurological: Negative for speech change and focal weakness.       PHYSICAL EXAM:     Physical Exam:  /72 (Site: Left Upper Arm, Position: Sitting)   Pulse 83   Resp 16   Ht 1.676 m (5' 6\")   Wt 77.7 kg (171 lb 6.4 oz)   SpO2 97%   BMI 27.66 kg/m²     Patient appears generally well, mood and affect are appropriate and pleasant.  HEENT:  Hearing intact, non-icteric, normocephalic, atraumatic.   Neck Exam: Supple, No JVD   Lung Exam: Clear to auscultation, even breath sounds.   Cardiac Exam: Regular rate and rhythm with no murmur or

## 2024-05-20 ENCOUNTER — TELEPHONE (OUTPATIENT)
Facility: CLINIC | Age: 64
End: 2024-05-20

## 2024-05-20 NOTE — TELEPHONE ENCOUNTER
Patient called requesting orders for A1C and LIPID to be done in August states you said 3 months but she wants to do it in august

## 2024-08-02 ENCOUNTER — CLINICAL DOCUMENTATION (OUTPATIENT)
Facility: CLINIC | Age: 64
End: 2024-08-02

## 2024-08-02 NOTE — PROGRESS NOTES
Received fax from [facility], no Colonoscopy report on file, only sent EGD from 10/13/2023.

## 2024-08-09 ENCOUNTER — OFFICE VISIT (OUTPATIENT)
Facility: CLINIC | Age: 64
End: 2024-08-09
Payer: COMMERCIAL

## 2024-08-09 VITALS
BODY MASS INDEX: 26.2 KG/M2 | OXYGEN SATURATION: 100 % | WEIGHT: 163 LBS | HEIGHT: 66 IN | DIASTOLIC BLOOD PRESSURE: 75 MMHG | HEART RATE: 75 BPM | RESPIRATION RATE: 16 BRPM | TEMPERATURE: 98 F | SYSTOLIC BLOOD PRESSURE: 113 MMHG

## 2024-08-09 DIAGNOSIS — E78.00 ELEVATED CHOLESTEROL: ICD-10-CM

## 2024-08-09 DIAGNOSIS — R73.01 IMPAIRED FASTING BLOOD SUGAR: Primary | ICD-10-CM

## 2024-08-09 DIAGNOSIS — Z12.31 ENCOUNTER FOR SCREENING MAMMOGRAM FOR MALIGNANT NEOPLASM OF BREAST: ICD-10-CM

## 2024-08-09 LAB — MAMMOGRAPHY, EXTERNAL: NORMAL

## 2024-08-09 PROCEDURE — 99214 OFFICE O/P EST MOD 30 MIN: CPT | Performed by: FAMILY MEDICINE

## 2024-08-09 NOTE — PROGRESS NOTES
Chief Complaint   Patient presents with    Abnormal Lab     History of Present Illness  The patient is a 63-year-old female who is seen today for follow-up of elevated cholesterol and elevated blood sugars.    She has been proactive in managing her health, having lost approximately 20 pounds over the past three months. This weight loss was achieved through a combination of dietary changes and increased physical activity, specifically walking about 3 miles per week. Despite these positive changes, she admits to occasionally indulging in sweets.        Reviewed and agree with Nurse Note and duplicated in this note.  Reviewed PmHx, RxHx, FmHx, SocHx, AllgHx and updated and dated in the chart.    Family History   Problem Relation Age of Onset    Diabetes Paternal Grandfather     Cancer Maternal Grandmother     Hypertension Sister     Diabetes Father     Diabetes Mother     Hypertension Mother        Past Medical History:   Diagnosis Date    Arthritis     Diverticular disease 11/10/2020    GERD (gastroesophageal reflux disease) 11/10/2020    Impaired fasting blood sugar 11/10/2020    Irritable bowel syndrome 11/10/2020    Osteoarthritis (arthritis due to wear and tear of joints)     knees    Overweight 11/10/2020    Sleep apnea in adult       Social History     Socioeconomic History    Marital status:    Tobacco Use    Smoking status: Never    Smokeless tobacco: Never     Social Determinants of Health     Financial Resource Strain: Low Risk  (1/24/2024)    Overall Financial Resource Strain (CARDIA)     Difficulty of Paying Living Expenses: Not hard at all   Food Insecurity: No Food Insecurity (1/24/2024)    Hunger Vital Sign     Worried About Running Out of Food in the Last Year: Never true     Ran Out of Food in the Last Year: Never true   Transportation Needs: Unknown (1/24/2024)    PRAPARE - Transportation     Lack of Transportation (Non-Medical): No   Physical Activity: Insufficiently Active (6/2/2022)

## 2024-08-10 LAB
ALBUMIN SERPL-MCNC: 4.4 G/DL (ref 3.9–4.9)
ALP SERPL-CCNC: 108 IU/L (ref 44–121)
ALT SERPL-CCNC: 13 IU/L (ref 0–32)
AST SERPL-CCNC: 16 IU/L (ref 0–40)
BILIRUB SERPL-MCNC: 0.4 MG/DL (ref 0–1.2)
BUN SERPL-MCNC: 10 MG/DL (ref 8–27)
BUN/CREAT SERPL: 12 (ref 12–28)
CALCIUM SERPL-MCNC: 9.9 MG/DL (ref 8.7–10.3)
CHLORIDE SERPL-SCNC: 102 MMOL/L (ref 96–106)
CHOLEST SERPL-MCNC: 213 MG/DL (ref 100–199)
CO2 SERPL-SCNC: 26 MMOL/L (ref 20–29)
CREAT SERPL-MCNC: 0.83 MG/DL (ref 0.57–1)
EGFRCR SERPLBLD CKD-EPI 2021: 80 ML/MIN/1.73
GLOBULIN SER CALC-MCNC: 2.9 G/DL (ref 1.5–4.5)
GLUCOSE SERPL-MCNC: 83 MG/DL (ref 70–99)
HBA1C MFR BLD: 6.1 % (ref 4.8–5.6)
HDLC SERPL-MCNC: 64 MG/DL
LDLC SERPL CALC-MCNC: 123 MG/DL (ref 0–99)
POTASSIUM SERPL-SCNC: 3.6 MMOL/L (ref 3.5–5.2)
PROT SERPL-MCNC: 7.3 G/DL (ref 6–8.5)
SODIUM SERPL-SCNC: 143 MMOL/L (ref 134–144)
TRIGL SERPL-MCNC: 149 MG/DL (ref 0–149)
VLDLC SERPL CALC-MCNC: 26 MG/DL (ref 5–40)

## 2025-01-14 ASSESSMENT — SLEEP AND FATIGUE QUESTIONNAIRES
HOW LIKELY ARE YOU TO NOD OFF OR FALL ASLEEP WHEN YOU ARE A PASSENGER IN A CAR FOR AN HOUR WITHOUT A BREAK: WOULD NEVER DOZE
HOW LIKELY ARE YOU TO NOD OFF OR FALL ASLEEP WHEN YOU ARE A PASSENGER IN A CAR FOR AN HOUR WITHOUT A BREAK: WOULD NEVER DOZE
HOW LIKELY ARE YOU TO NOD OFF OR FALL ASLEEP WHILE SITTING AND TALKING TO SOMEONE: WOULD NEVER DOZE
HOW LIKELY ARE YOU TO NOD OFF OR FALL ASLEEP WHILE SITTING AND READING: SLIGHT CHANCE OF DOZING
HOW LIKELY ARE YOU TO NOD OFF OR FALL ASLEEP WHILE WATCHING TV: SLIGHT CHANCE OF DOZING
HOW LIKELY ARE YOU TO NOD OFF OR FALL ASLEEP WHILE SITTING INACTIVE IN A PUBLIC PLACE: SLIGHT CHANCE OF DOZING
HOW LIKELY ARE YOU TO NOD OFF OR FALL ASLEEP WHILE SITTING QUIETLY AFTER LUNCH WITHOUT ALCOHOL: SLIGHT CHANCE OF DOZING
HOW LIKELY ARE YOU TO NOD OFF OR FALL ASLEEP IN A CAR, WHILE STOPPED FOR A FEW MINUTES IN TRAFFIC: WOULD NEVER DOZE
HOW LIKELY ARE YOU TO NOD OFF OR FALL ASLEEP WHILE SITTING AND READING: SLIGHT CHANCE OF DOZING
ESS TOTAL SCORE: 5
HOW LIKELY ARE YOU TO NOD OFF OR FALL ASLEEP WHILE LYING DOWN TO REST IN THE AFTERNOON WHEN CIRCUMSTANCES PERMIT: SLIGHT CHANCE OF DOZING
HOW LIKELY ARE YOU TO NOD OFF OR FALL ASLEEP WHILE SITTING QUIETLY AFTER LUNCH WITHOUT ALCOHOL: SLIGHT CHANCE OF DOZING
HOW LIKELY ARE YOU TO NOD OFF OR FALL ASLEEP WHILE SITTING AND TALKING TO SOMEONE: WOULD NEVER DOZE
HOW LIKELY ARE YOU TO NOD OFF OR FALL ASLEEP WHILE SITTING INACTIVE IN A PUBLIC PLACE: SLIGHT CHANCE OF DOZING
HOW LIKELY ARE YOU TO NOD OFF OR FALL ASLEEP WHILE WATCHING TV: SLIGHT CHANCE OF DOZING
HOW LIKELY ARE YOU TO NOD OFF OR FALL ASLEEP IN A CAR, WHILE STOPPED FOR A FEW MINUTES IN TRAFFIC: WOULD NEVER DOZE
HOW LIKELY ARE YOU TO NOD OFF OR FALL ASLEEP WHILE LYING DOWN TO REST IN THE AFTERNOON WHEN CIRCUMSTANCES PERMIT: SLIGHT CHANCE OF DOZING

## 2025-01-16 ASSESSMENT — SLEEP AND FATIGUE QUESTIONNAIRES
DO YOU HAVE DIFFICULTY VISITING YOUR FAMILY OR FRIENDS IN THEIR HOME BECAUSE YOU BECOME SLEEPY OR TIRED: NO
DO YOU HAVE DIFFICULTY OPERATING A MOTOR VEHICLE FOR LONG DISTANCES (GREATER THAN 100 MILES) BECAUSE YOU BECOME SLEEPY: NO
HAS YOUR RELATIONSHIP WITH FAMILY, FRIENDS OR WORK COLLEAGUES BEEN AFFECTED BECAUSE YOU ARE SLEEPY OR TIRED: NO
HAS YOUR MOOD BEEN AFFECTED BECAUSE YOU ARE SLEEPY OR TIRED: YES, LITTLE
DO YOU HAVE DIFFICULTY WATCHING A MOVIE OR VIDEO BECAUSE YOU BECOME SLEEPY OR TIRED: NO
DO YOU HAVE DIFFICULTY OPERATING A MOTOR VEHICLE FOR SHORT DISTANCES (LESS THAN 100 MILES) BECAUSE YOU BECOME SLEEPY: NO
DO YOU HAVE DIFFICULTY BEING AS ACTIVE AS YOU WANT TO BE IN THE MORNING BECAUSE YOU ARE SLEEPY OR TIRED: YES, LITTLE
FOSQ SCORE: 18.5
DO YOU HAVE DIFFICULTY CONCENTRATING ON THE THINGS YOU DO BECAUSE YOU ARE SLEEPY OR TIRED: NO
DO YOU GENERALLY HAVE DIFFICULTY REMEMBERING THINGS BECAUSE YOU ARE SLEEPY OR TIRED: YES, A LITTLE
DO YOU HAVE DIFFICULTY BEING AS ACTIVE AS YOU WANT TO BE IN THE EVENING BECAUSE YOU ARE SLEEPY OR TIRED: NO

## 2025-01-17 ENCOUNTER — OFFICE VISIT (OUTPATIENT)
Age: 65
End: 2025-01-17
Payer: COMMERCIAL

## 2025-01-17 VITALS
HEIGHT: 66 IN | OXYGEN SATURATION: 98 % | HEART RATE: 78 BPM | WEIGHT: 162.2 LBS | BODY MASS INDEX: 26.07 KG/M2 | TEMPERATURE: 97.7 F | SYSTOLIC BLOOD PRESSURE: 112 MMHG | DIASTOLIC BLOOD PRESSURE: 69 MMHG

## 2025-01-17 DIAGNOSIS — G47.33 OSA (OBSTRUCTIVE SLEEP APNEA): Primary | ICD-10-CM

## 2025-01-17 PROCEDURE — 99213 OFFICE O/P EST LOW 20 MIN: CPT | Performed by: SPECIALIST

## 2025-01-17 NOTE — PROGRESS NOTES
Patient is having problems with the seal since her surgery  She is not getting good rest; having more events

## 2025-01-17 NOTE — PROGRESS NOTES
[facility]  [facility] [place]  [address]  [facility] [address]  Dept: [phone]              [address]  [place] [address]  Tel.  [phone]  Fax. [phone] [address]  [place] [address]  Tel.  [phone]  Fax. [phone] [address] [place] [address]  Tel.  [phone]  Fax. [phone]         Chief Complaint       Chief Complaint   Patient presents with    Sleep Problem     Yearly follow up          HPI        Mireille Li is a 64 y.o. female seen for follow-up.  She was evaluated with a home sleep study which demonstrated sleep disordered breathing characterized by overall AHI of 10.9/h associated with minimal SaO2 of 70%.  Events more prominently  supine with a supine-related AHI 15.1/h.  Snoring during 45.4%.      APAP 6-16 cm initiated.  Compliance review demonstrated AHI variability.  Pressure was changed to 8-18 cm.    Device set up date: 10/28/2022.  Current device settin-18 cm.    Compliance reviewed in detail with the patient today. During the past 30 days, APAP 4-18 cm used during 30 days with the average daily use of 8.1 hours. CMS compliance criteria 100%. AHI 7.5 per hour.     Events have been more frequent during the past 3 weeks; elevated leak.    Allergies   Allergen Reactions    Latex Rash     Latex gloves    Shrimp (Diagnostic) Anaphylaxis and Other (See Comments)       No current facility-administered medications for this visit.     She  has a past medical history of Arthritis, Chronic back pain, Diverticular disease, GERD (gastroesophageal reflux disease), Impaired fasting blood sugar, Irritable bowel syndrome, Osteoarthritis (arthritis due to wear and tear of joints), Overweight, and Sleep apnea in adult.    She  has a past surgical history that includes gyn; gi; hernia repair; Carpal tunnel release; Tubal ligation (2002); Umbilical

## 2025-01-20 ENCOUNTER — CLINICAL DOCUMENTATION (OUTPATIENT)
Age: 65
End: 2025-01-20

## 2025-01-21 ASSESSMENT — SLEEP AND FATIGUE QUESTIONNAIRES
HOW LIKELY ARE YOU TO NOD OFF OR FALL ASLEEP WHILE SITTING QUIETLY AFTER LUNCH WITHOUT ALCOHOL: WOULD NEVER DOZE
HOW LIKELY ARE YOU TO NOD OFF OR FALL ASLEEP WHILE WATCHING TV: SLIGHT CHANCE OF DOZING
HOW LIKELY ARE YOU TO NOD OFF OR FALL ASLEEP WHILE SITTING INACTIVE IN A PUBLIC PLACE: SLIGHT CHANCE OF DOZING
HOW LIKELY ARE YOU TO NOD OFF OR FALL ASLEEP IN A CAR, WHILE STOPPED FOR A FEW MINUTES IN TRAFFIC: WOULD NEVER DOZE
ESS TOTAL SCORE: 4
HOW LIKELY ARE YOU TO NOD OFF OR FALL ASLEEP WHILE LYING DOWN TO REST IN THE AFTERNOON WHEN CIRCUMSTANCES PERMIT: SLIGHT CHANCE OF DOZING
HOW LIKELY ARE YOU TO NOD OFF OR FALL ASLEEP WHILE SITTING AND READING: SLIGHT CHANCE OF DOZING
HOW LIKELY ARE YOU TO NOD OFF OR FALL ASLEEP WHILE SITTING AND TALKING TO SOMEONE: WOULD NEVER DOZE
HOW LIKELY ARE YOU TO NOD OFF OR FALL ASLEEP WHEN YOU ARE A PASSENGER IN A CAR FOR AN HOUR WITHOUT A BREAK: WOULD NEVER DOZE

## 2025-02-10 ENCOUNTER — OFFICE VISIT (OUTPATIENT)
Facility: CLINIC | Age: 65
End: 2025-02-10

## 2025-02-10 VITALS
OXYGEN SATURATION: 97 % | HEART RATE: 79 BPM | RESPIRATION RATE: 16 BRPM | TEMPERATURE: 98.2 F | DIASTOLIC BLOOD PRESSURE: 79 MMHG | BODY MASS INDEX: 26.03 KG/M2 | SYSTOLIC BLOOD PRESSURE: 115 MMHG | HEIGHT: 66 IN | WEIGHT: 162 LBS

## 2025-02-10 DIAGNOSIS — R73.01 IMPAIRED FASTING BLOOD SUGAR: ICD-10-CM

## 2025-02-10 DIAGNOSIS — M17.12 OSTEOARTHRITIS OF LEFT KNEE, UNSPECIFIED OSTEOARTHRITIS TYPE: ICD-10-CM

## 2025-02-10 DIAGNOSIS — E78.00 ELEVATED CHOLESTEROL: ICD-10-CM

## 2025-02-10 DIAGNOSIS — R41.3 MEMORY DEFICIT: ICD-10-CM

## 2025-02-10 DIAGNOSIS — M17.11 PRIMARY OSTEOARTHRITIS OF RIGHT KNEE: ICD-10-CM

## 2025-02-10 DIAGNOSIS — Z00.00 WELL WOMAN EXAM (NO GYNECOLOGICAL EXAM): Primary | ICD-10-CM

## 2025-02-10 SDOH — ECONOMIC STABILITY: FOOD INSECURITY: WITHIN THE PAST 12 MONTHS, YOU WORRIED THAT YOUR FOOD WOULD RUN OUT BEFORE YOU GOT MONEY TO BUY MORE.: NEVER TRUE

## 2025-02-10 SDOH — ECONOMIC STABILITY: FOOD INSECURITY: WITHIN THE PAST 12 MONTHS, THE FOOD YOU BOUGHT JUST DIDN'T LAST AND YOU DIDN'T HAVE MONEY TO GET MORE.: NEVER TRUE

## 2025-02-10 ASSESSMENT — PATIENT HEALTH QUESTIONNAIRE - PHQ9
SUM OF ALL RESPONSES TO PHQ QUESTIONS 1-9: 0
SUM OF ALL RESPONSES TO PHQ QUESTIONS 1-9: 0
SUM OF ALL RESPONSES TO PHQ9 QUESTIONS 1 & 2: 0
SUM OF ALL RESPONSES TO PHQ QUESTIONS 1-9: 0
SUM OF ALL RESPONSES TO PHQ QUESTIONS 1-9: 0
1. LITTLE INTEREST OR PLEASURE IN DOING THINGS: NOT AT ALL
2. FEELING DOWN, DEPRESSED OR HOPELESS: NOT AT ALL

## 2025-02-10 NOTE — PROGRESS NOTES
Chief Complaint   Patient presents with    Annual Exam     Patient is here for a wellness visit      she is a 64 y.o. year old female who presents for CPE  Complete Physical Exam Questions:    LMP -  N/a  Last Pap (q 3 years, or q5 with HPV) - 5/2024   Last Mammogram (50-74 biennially)- 5/2024  Hx of abnl Pap - Yes    1.  Do you follow a low fat diet?  no  2.  Are you up to date on your Tdap (<10 years)?  Yes  3.  Have you ever had a Pneumovax vaccine (>65)?  No   PCV13 No   PPSV23 No  4.  Have you had Zoster vaccine (>60)? No  5.  Have you had the HPV - Gardasil (13- 26)? Not applicable  6.  Do you follow an exercise program?  Yes  7.  Do you smoke?  No  8.  Do you consider yourself overweight?  No  9.  Is there a family history of CAD< age 50? No  10.  Is there a family history of Cancer?  No  11.  Do you know your Cancer risks?  No  12.  Have you had a colonoscopy?  Yes  13. Have you been tested for HIV or other STI's? No HIV testing today(18-66 y/o)?No  14.  Have had a bone density scan or DEXA done(>65)?No  15.  Have you had an EKG performed in the last five years (>50)?  Yes    Other complaints:    Reviewed and agree with Nurse Note and duplicated in this note.  Reviewed PmHx, RxHx, FmHx, SocHx, AllgHx and updated and dated in the chart.    Family History   Problem Relation Age of Onset    Diabetes Paternal Grandfather     Cancer Maternal Grandmother         Uterine    Hypertension Sister     High Blood Pressure Sister     Diabetes Father     High Blood Pressure Father     Diabetes Mother     Hypertension Mother     High Blood Pressure Mother     Alcohol Abuse Maternal Grandfather     High Blood Pressure Sister     High Blood Pressure Sister        Past Medical History:   Diagnosis Date    Arthritis     Chronic back pain     Cervical radiculopathy    Diverticular disease 11/10/2020    GERD (gastroesophageal reflux disease) 11/10/2020    Impaired fasting blood sugar 11/10/2020    Irritable bowel syndrome

## 2025-02-11 LAB
ALBUMIN SERPL-MCNC: 4.3 G/DL (ref 3.9–4.9)
ALP SERPL-CCNC: 103 IU/L (ref 44–121)
ALT SERPL-CCNC: 19 IU/L (ref 0–32)
AST SERPL-CCNC: 17 IU/L (ref 0–40)
BASOPHILS # BLD AUTO: 0.1 X10E3/UL (ref 0–0.2)
BASOPHILS NFR BLD AUTO: 1 %
BILIRUB SERPL-MCNC: 0.4 MG/DL (ref 0–1.2)
BUN SERPL-MCNC: 12 MG/DL (ref 8–27)
BUN/CREAT SERPL: 15 (ref 12–28)
CALCIUM SERPL-MCNC: 9.4 MG/DL (ref 8.7–10.3)
CHLORIDE SERPL-SCNC: 102 MMOL/L (ref 96–106)
CHOLEST SERPL-MCNC: 236 MG/DL (ref 100–199)
CO2 SERPL-SCNC: 24 MMOL/L (ref 20–29)
CREAT SERPL-MCNC: 0.81 MG/DL (ref 0.57–1)
EGFRCR SERPLBLD CKD-EPI 2021: 82 ML/MIN/1.73
EOSINOPHIL # BLD AUTO: 0.1 X10E3/UL (ref 0–0.4)
EOSINOPHIL NFR BLD AUTO: 3 %
ERYTHROCYTE [DISTWIDTH] IN BLOOD BY AUTOMATED COUNT: 14 % (ref 11.7–15.4)
GLOBULIN SER CALC-MCNC: 2.9 G/DL (ref 1.5–4.5)
GLUCOSE SERPL-MCNC: 88 MG/DL (ref 70–99)
HBA1C MFR BLD: 5.9 % (ref 4.8–5.6)
HCT VFR BLD AUTO: 37.1 % (ref 34–46.6)
HDLC SERPL-MCNC: 62 MG/DL
HGB BLD-MCNC: 12.2 G/DL (ref 11.1–15.9)
IMM GRANULOCYTES # BLD AUTO: 0 X10E3/UL (ref 0–0.1)
IMM GRANULOCYTES NFR BLD AUTO: 0 %
LDLC SERPL CALC-MCNC: 143 MG/DL (ref 0–99)
LYMPHOCYTES # BLD AUTO: 2.3 X10E3/UL (ref 0.7–3.1)
LYMPHOCYTES NFR BLD AUTO: 50 %
MCH RBC QN AUTO: 28.8 PG (ref 26.6–33)
MCHC RBC AUTO-ENTMCNC: 32.9 G/DL (ref 31.5–35.7)
MCV RBC AUTO: 88 FL (ref 79–97)
MONOCYTES # BLD AUTO: 0.4 X10E3/UL (ref 0.1–0.9)
MONOCYTES NFR BLD AUTO: 9 %
NEUTROPHILS # BLD AUTO: 1.7 X10E3/UL (ref 1.4–7)
NEUTROPHILS NFR BLD AUTO: 37 %
PLATELET # BLD AUTO: 280 X10E3/UL (ref 150–450)
POTASSIUM SERPL-SCNC: 4 MMOL/L (ref 3.5–5.2)
PROT SERPL-MCNC: 7.2 G/DL (ref 6–8.5)
RBC # BLD AUTO: 4.24 X10E6/UL (ref 3.77–5.28)
SODIUM SERPL-SCNC: 140 MMOL/L (ref 134–144)
TRIGL SERPL-MCNC: 173 MG/DL (ref 0–149)
TSH SERPL DL<=0.005 MIU/L-ACNC: 1.4 UIU/ML (ref 0.45–4.5)
VLDLC SERPL CALC-MCNC: 31 MG/DL (ref 5–40)
WBC # BLD AUTO: 4.5 X10E3/UL (ref 3.4–10.8)

## 2025-02-13 ENCOUNTER — OFFICE VISIT (OUTPATIENT)
Facility: CLINIC | Age: 65
End: 2025-02-13

## 2025-02-13 VITALS
HEIGHT: 66 IN | WEIGHT: 163.6 LBS | OXYGEN SATURATION: 98 % | RESPIRATION RATE: 16 BRPM | DIASTOLIC BLOOD PRESSURE: 73 MMHG | HEART RATE: 65 BPM | TEMPERATURE: 98.2 F | BODY MASS INDEX: 26.29 KG/M2 | SYSTOLIC BLOOD PRESSURE: 122 MMHG

## 2025-02-13 DIAGNOSIS — M17.12 OSTEOARTHRITIS OF LEFT KNEE, UNSPECIFIED OSTEOARTHRITIS TYPE: Primary | ICD-10-CM

## 2025-02-13 RX ORDER — TRIAMCINOLONE ACETONIDE 40 MG/ML
40 INJECTION, SUSPENSION INTRA-ARTICULAR; INTRAMUSCULAR ONCE
Status: COMPLETED | OUTPATIENT
Start: 2025-02-13 | End: 2025-02-13

## 2025-02-13 RX ADMIN — TRIAMCINOLONE ACETONIDE 40 MG: 40 INJECTION, SUSPENSION INTRA-ARTICULAR; INTRAMUSCULAR at 13:45

## 2025-02-13 NOTE — PROGRESS NOTES
return to clinic prn if such symptoms occur or there is failure to improve as anticipated.  The procedure did provide relief of symptoms in the clinic.    RTC in 4 weeks for reevaluation and possible reinjection.     Given the patient's body habitus and the anatomically deep nature of this structure, sonographic guidance is recommended to prevent injury to neurovascular structures and confirm accuracy of injection. Furthermore, this patient has failed conservative treatment with physical therapy and modalities and the diagnostic and therapeutic accuracy is important.    Assessment/ Plan:   Mireille was seen today for follow-up and knee pain.    Diagnoses and all orders for this visit:    Osteoarthritis of left knee, unspecified osteoarthritis type  -     triamcinolone acetonide (KENALOG-40) injection 40 mg  -     AMB POC US DRAIN/INJECT LARGE JOINT/BURSA  -     DRAIN/INJECT LARGE JOINT/BURSA (20610)        Return if symptoms worsen or fail to improve.    Pathophysiology, recovery and rehabilitation process discussed and questions answered   Counseling for 30 Minutes of the total visit duration   Pictures and figures used as necessary   Provided reassurance   Monitor response to Physical Therapy   Recommend activity modification   Recommend  lower impact activities-walking, Eliptical, Nordic Track, cycling or swimming               I have discussed the diagnosis with the patient and the intended plan as seen in the above orders.  The patient has received an after-visit summary and questions were answered concerning future plans.     Medication Side Effects and Warnings were discussed with patient,  Patient Labs were reviewed and or requested, and  Patient Past Records were reviewed and or requested  yes     Pt agrees to call or return to clinic and/or go to closest ER with any worsening of symptoms.  This may include, but not limited to increased fever (>100.4) with NSAIDS or Tylenol, increased edema, confusion, rash,

## 2025-02-13 NOTE — PATIENT INSTRUCTIONS
Learning About Joint Injections  What are joint injections?     Joint injections are shots into a joint, such as the knee or shoulder. They are used to put in medicines, such as pain relievers and steroid medicines. Steroids can help reduce inflammation. A steroid shot can sometimes help with short-term pain relief when other treatments haven't worked.  How are they done?  First, the area over the joint will be cleaned. Your doctor may then use a tiny needle to numb the skin in the area where you will get the joint injection.  If a tiny needle is used to numb the area, your doctor will use another needle to inject the medicine. Your doctor may use a pain reliever, a steroid, or both. You may feel some pressure or discomfort.  Your doctor may put ice on the area before you go home.  What can you expect after a joint injection?  You will probably go home soon after your shot. You may have numbness around the joint for a few hours.  If your shot included both a pain reliever and a steroid, then the pain will probably go away right away. But it might come back after a few hours. This might happen if the pain reliever wears off and the steroid hasn't started to work yet. Steroids don't always work. But when they do, the pain relief can last for several days to a few months or longer.  Your doctor may tell you to use ice on the area. You can also use ice if the pain comes back. Put ice or a cold pack on your joint for 10 to 20 minutes at a time. Put a thin cloth between the ice and your skin.  Follow your doctor's instructions carefully.  Follow-up care is a key part of your treatment and safety. Be sure to make and go to all appointments, and call your doctor if you are having problems. It's also a good idea to know your test results and keep a list of the medicines you take.  Current as of: July 31, 2024  Content Version: 14.3  © 2024 [employer].   Care instructions adapted under license by [facility]

## 2025-05-07 ENCOUNTER — TELEPHONE (OUTPATIENT)
Age: 65
End: 2025-05-07

## 2025-06-26 ENCOUNTER — OFFICE VISIT (OUTPATIENT)
Facility: CLINIC | Age: 65
End: 2025-06-26

## 2025-06-26 VITALS
DIASTOLIC BLOOD PRESSURE: 79 MMHG | HEIGHT: 66 IN | TEMPERATURE: 98 F | BODY MASS INDEX: 25.88 KG/M2 | RESPIRATION RATE: 16 BRPM | HEART RATE: 57 BPM | OXYGEN SATURATION: 99 % | WEIGHT: 161 LBS | SYSTOLIC BLOOD PRESSURE: 117 MMHG

## 2025-06-26 DIAGNOSIS — M25.812 IMPINGEMENT OF LEFT SHOULDER: ICD-10-CM

## 2025-06-26 DIAGNOSIS — M62.838 TRAPEZIUS MUSCLE SPASM: Primary | ICD-10-CM

## 2025-06-26 RX ORDER — TRIAMCINOLONE ACETONIDE 40 MG/ML
40 INJECTION, SUSPENSION INTRA-ARTICULAR; INTRAMUSCULAR ONCE
Status: SHIPPED | OUTPATIENT
Start: 2025-06-26

## 2025-06-26 NOTE — PATIENT INSTRUCTIONS
Learning About Joint Injections  What are joint injections?     Joint injections are shots into a joint, such as the knee or shoulder. They are used to put in medicines, such as pain relievers and steroid medicines. Steroids can help reduce inflammation. A steroid shot can sometimes help with short-term pain relief when other treatments haven't worked.  How are they done?  First, the area over the joint will be cleaned. Your doctor may then use a tiny needle to numb the skin in the area where you will get the joint injection.  If a tiny needle is used to numb the area, your doctor will use another needle to inject the medicine. Your doctor may use a pain reliever, a steroid, or both. You may feel some pressure or discomfort.  Your doctor may put ice on the area before you go home.  What can you expect after a joint injection?  You will probably go home soon after your shot. You may have numbness around the joint for a few hours.  If your shot included both a pain reliever and a steroid, then the pain will probably go away right away. But it might come back after a few hours. This might happen if the pain reliever wears off and the steroid hasn't started to work yet. Steroids don't always work. But when they do, the pain relief can last for several days to a few months or longer.  Your doctor may tell you to use ice on the area. You can also use ice if the pain comes back. Put ice or a cold pack on your joint for 10 to 20 minutes at a time. Put a thin cloth between the ice and your skin.  Follow your doctor's instructions carefully.  Follow-up care is a key part of your treatment and safety. Be sure to make and go to all appointments, and call your doctor if you are having problems. It's also a good idea to know your test results and keep a list of the medicines you take.  Current as of: July 31, 2024  Content Version: 14.5  © 2024-2025 [employer].   Care instructions adapted under license by [facility]

## 2025-06-26 NOTE — PROGRESS NOTES
test:is  negative.  The Empty Can test:is  negative.  Stressed ext rotation is:  negative.  Stressed int rotation: negative.   The Apprehension Sign is negative.    The Lift off test is:  negative   Examination reveals the Painful Arch:  negative.   The Labral Test is:  negative.   The Sulcus Sign is:  negative.    Neurologic: gait and coordination normal and speech normal   Time Out taken at:  2:03 PM  6/26/2025    * Patient was identified by name and date of birth   * Agreement on procedure being performed was verified  * Risks and Benefits explained to the patient  * Procedure site verified and marked as necessary  * Patient was positioned for comfort  * Consent was signed and verified   In the presence of:    Witness: Genni  Injection #: 1  Needle:  27  Procedure:                     This procedure was discussed with Mireille Li and other therapeutic options were considered (risks vs benefits).  Mireille Li and I thought that an injection was merited.  After informed consent was obtained, landmarks were identified(marked), and the left trigger point  was cleansed with ChlorPrep in the standard sterile manner. 2mL  1% lidocaine  and  1mL Kenalog  was then injected and needle tenotomy was not performed.  Procedure performed with ultrasound needle guidance.   The needle was then withdrawn. T he procedure was well tolerated.  The patient is asked to continue to rest the area for a few more days before resuming regular activities.  It may be more painful for the first 1-2 days.  NSAIDS are to be avoided.  Watch for fever, or increased swelling or persistent pain in the joint. Call or return to clinic prn if such symptoms occur or there is failure to improve as anticipated.  The procedure did provide relief of symptoms in the clinic.    RTC in 4 weeks for reevaluation and possible reinjection.     Given the patient's body habitus and the anatomically deep nature of this structure, sonographic

## 2025-07-29 ENCOUNTER — TELEMEDICINE (OUTPATIENT)
Age: 65
End: 2025-07-29
Payer: COMMERCIAL

## 2025-07-29 ENCOUNTER — CLINICAL DOCUMENTATION (OUTPATIENT)
Age: 65
End: 2025-07-29

## 2025-07-29 DIAGNOSIS — G47.33 OSA (OBSTRUCTIVE SLEEP APNEA): Primary | ICD-10-CM

## 2025-07-29 PROCEDURE — 99213 OFFICE O/P EST LOW 20 MIN: CPT | Performed by: SPECIALIST

## 2025-07-29 ASSESSMENT — SLEEP AND FATIGUE QUESTIONNAIRES
HAS YOUR RELATIONSHIP WITH FAMILY, FRIENDS OR WORK COLLEAGUES BEEN AFFECTED BECAUSE YOU ARE SLEEPY OR TIRED: NO
DO YOU HAVE DIFFICULTY BEING AS ACTIVE AS YOU WANT TO BE IN THE EVENING BECAUSE YOU ARE SLEEPY OR TIRED: YES, LITTLE
HOW LIKELY ARE YOU TO NOD OFF OR FALL ASLEEP WHILE SITTING QUIETLY AFTER LUNCH WITHOUT ALCOHOL: WOULD NEVER DOZE
HOW LIKELY ARE YOU TO NOD OFF OR FALL ASLEEP WHEN YOU ARE A PASSENGER IN A CAR FOR AN HOUR WITHOUT A BREAK: WOULD NEVER DOZE
HOW LIKELY ARE YOU TO NOD OFF OR FALL ASLEEP WHILE LYING DOWN TO REST IN THE AFTERNOON WHEN CIRCUMSTANCES PERMIT: SLIGHT CHANCE OF DOZING
DO YOU GENERALLY HAVE DIFFICULTY REMEMBERING THINGS BECAUSE YOU ARE SLEEPY OR TIRED: YES, MODERATE
HOW LIKELY ARE YOU TO NOD OFF OR FALL ASLEEP WHILE WATCHING TV: SLIGHT CHANCE OF DOZING
HOW LIKELY ARE YOU TO NOD OFF OR FALL ASLEEP WHILE SITTING AND READING: SLIGHT CHANCE OF DOZING
HAS YOUR MOOD BEEN AFFECTED BECAUSE YOU ARE SLEEPY OR TIRED: YES, LITTLE
DO YOU HAVE DIFFICULTY OPERATING A MOTOR VEHICLE FOR LONG DISTANCES (GREATER THAN 100 MILES) BECAUSE YOU BECOME SLEEPY: YES, A LITTLE
HOW LIKELY ARE YOU TO NOD OFF OR FALL ASLEEP WHILE SITTING INACTIVE IN A PUBLIC PLACE: WOULD NEVER DOZE
HOW LIKELY ARE YOU TO NOD OFF OR FALL ASLEEP WHILE SITTING AND TALKING TO SOMEONE: WOULD NEVER DOZE
HOW LIKELY ARE YOU TO NOD OFF OR FALL ASLEEP WHILE SITTING AND READING: SLIGHT CHANCE OF DOZING
HOW LIKELY ARE YOU TO NOD OFF OR FALL ASLEEP WHILE WATCHING TV: SLIGHT CHANCE OF DOZING
HOW LIKELY ARE YOU TO NOD OFF OR FALL ASLEEP WHILE SITTING QUIETLY AFTER LUNCH WITHOUT ALCOHOL: WOULD NEVER DOZE
HOW LIKELY ARE YOU TO NOD OFF OR FALL ASLEEP WHILE SITTING AND TALKING TO SOMEONE: WOULD NEVER DOZE
DO YOU HAVE DIFFICULTY OPERATING A MOTOR VEHICLE FOR SHORT DISTANCES (LESS THAN 100 MILES) BECAUSE YOU BECOME SLEEPY: NO
HOW LIKELY ARE YOU TO NOD OFF OR FALL ASLEEP WHEN YOU ARE A PASSENGER IN A CAR FOR AN HOUR WITHOUT A BREAK: WOULD NEVER DOZE
HOW LIKELY ARE YOU TO NOD OFF OR FALL ASLEEP IN A CAR, WHILE STOPPED FOR A FEW MINUTES IN TRAFFIC: WOULD NEVER DOZE
DO YOU HAVE DIFFICULTY CONCENTRATING ON THE THINGS YOU DO BECAUSE YOU ARE SLEEPY OR TIRED: YES, A LITTLE
HOW LIKELY ARE YOU TO NOD OFF OR FALL ASLEEP WHILE LYING DOWN TO REST IN THE AFTERNOON WHEN CIRCUMSTANCES PERMIT: SLIGHT CHANCE OF DOZING
DO YOU HAVE DIFFICULTY BEING AS ACTIVE AS YOU WANT TO BE IN THE MORNING BECAUSE YOU ARE SLEEPY OR TIRED: YES, LITTLE
HOW LIKELY ARE YOU TO NOD OFF OR FALL ASLEEP IN A CAR, WHILE STOPPED FOR A FEW MINUTES IN TRAFFIC: WOULD NEVER DOZE
FOSQ SCORE: 16
HOW LIKELY ARE YOU TO NOD OFF OR FALL ASLEEP WHILE SITTING INACTIVE IN A PUBLIC PLACE: WOULD NEVER DOZE
DO YOU HAVE DIFFICULTY VISITING YOUR FAMILY OR FRIENDS IN THEIR HOME BECAUSE YOU BECOME SLEEPY OR TIRED: NO
DO YOU HAVE DIFFICULTY WATCHING A MOVIE OR VIDEO BECAUSE YOU BECOME SLEEPY OR TIRED: YES, A LITTLE
ESS TOTAL SCORE: 3

## 2025-07-29 NOTE — PROGRESS NOTES
[facility] [id number]  [facility]  [address] [facility] [address]  Dept: [phone]              [address]  [place] [address]  Tel.  [phone]  Fax. [phone] [address]  [place] [address]  Tel.  [phone]  Fax. [phone] [address] [place] [address]  Tel.  [phone]  Fax. [phone]     Mireille Li, was evaluated through a synchronous (real-time) audio-video encounter. The patient (or guardian if applicable) is aware that this is a billable service, which includes applicable co-pays. This Virtual Visit was conducted with patient's (and/or legal guardian's) consent. Patient identification was verified, and a caregiver was present when appropriate.   The patient was located at Home: [address] [place] [id number]  Provider was located at Facility (Appt Dept): [id number] [place] [id number]  Confirm you are appropriately licensed, registered, or certified to deliver care in the [facility] where the patient is located as indicated above. If you are not or unsure, please re-schedule the visit: Yes, I confirm.      Total time spent for this encounter: Greater than20 minutes spent in direct video patient care, chart review and planning    --Ryan Eisenberg MD on 7/29/2025 at 2:04 PM    An electronic signature was used to authenticate this note.     Chief Complaint       Chief Complaint   Patient presents with    Sleep Problem     3 month follow up         HPI        Mireille Li is a 64 y.o. female seen for follow-up. She was evaluated with a home sleep study which demonstrated sleep disordered breathing characterized by overall AHI of 10.9/h associated with minimal SaO2 of 70%.  Events more prominently  supine with a supine-related AHI 15.1/h.  Snoring during 45.4%.      APAP 6-16 cm initiated.  Compliance review

## 2025-08-26 ENCOUNTER — PROCEDURE VISIT (OUTPATIENT)
Age: 65
End: 2025-08-26

## 2025-08-26 DIAGNOSIS — G47.33 OSA (OBSTRUCTIVE SLEEP APNEA): Primary | ICD-10-CM

## 2025-09-03 ENCOUNTER — OFFICE VISIT (OUTPATIENT)
Age: 65
End: 2025-09-03
Payer: COMMERCIAL

## 2025-09-03 DIAGNOSIS — R41.3 SHORT-TERM MEMORY LOSS: Primary | ICD-10-CM

## 2025-09-03 DIAGNOSIS — F41.9 ANXIETY: ICD-10-CM

## 2025-09-03 DIAGNOSIS — Z82.0 FAMILY HISTORY OF ALZHEIMER'S DISEASE: ICD-10-CM

## 2025-09-03 PROCEDURE — 90791 PSYCH DIAGNOSTIC EVALUATION: CPT | Performed by: STUDENT IN AN ORGANIZED HEALTH CARE EDUCATION/TRAINING PROGRAM

## 2025-09-04 ENCOUNTER — TELEPHONE (OUTPATIENT)
Age: 65
End: 2025-09-04